# Patient Record
Sex: MALE | Race: WHITE | Employment: FULL TIME | URBAN - METROPOLITAN AREA
[De-identification: names, ages, dates, MRNs, and addresses within clinical notes are randomized per-mention and may not be internally consistent; named-entity substitution may affect disease eponyms.]

---

## 2018-04-26 RX ORDER — OMEPRAZOLE 20 MG/1
CAPSULE, DELAYED RELEASE ORAL
Qty: 30 CAPSULE | Refills: 0 | Status: SHIPPED | OUTPATIENT
Start: 2018-04-26 | End: 2018-06-01 | Stop reason: SDUPTHER

## 2018-06-01 RX ORDER — OMEPRAZOLE 20 MG/1
CAPSULE, DELAYED RELEASE ORAL
Qty: 30 CAPSULE | Refills: 2 | Status: SHIPPED | OUTPATIENT
Start: 2018-06-01 | End: 2018-08-08 | Stop reason: SDUPTHER

## 2018-06-29 ENCOUNTER — TELEPHONE (OUTPATIENT)
Dept: FAMILY MEDICINE CLINIC | Age: 53
End: 2018-06-29

## 2018-06-29 RX ORDER — IBUPROFEN 800 MG/1
800 TABLET ORAL EVERY 8 HOURS PRN
Qty: 60 TABLET | Refills: 1 | Status: SHIPPED | OUTPATIENT
Start: 2018-06-29 | End: 2018-08-31

## 2018-08-06 ENCOUNTER — TELEPHONE (OUTPATIENT)
Dept: FAMILY MEDICINE CLINIC | Age: 53
End: 2018-08-06

## 2018-08-09 RX ORDER — OMEPRAZOLE 20 MG/1
CAPSULE, DELAYED RELEASE ORAL
Qty: 90 CAPSULE | Refills: 3 | Status: SHIPPED | OUTPATIENT
Start: 2018-08-09 | End: 2018-08-31 | Stop reason: SDUPTHER

## 2018-08-09 RX ORDER — OMEPRAZOLE 20 MG/1
CAPSULE, DELAYED RELEASE ORAL
Qty: 30 CAPSULE | Refills: 5 | Status: SHIPPED | OUTPATIENT
Start: 2018-08-09 | End: 2018-08-09 | Stop reason: SDUPTHER

## 2018-08-09 RX ORDER — LEVOTHYROXINE SODIUM 0.2 MG/1
200 TABLET ORAL DAILY
Qty: 90 TABLET | Refills: 3 | Status: SHIPPED | OUTPATIENT
Start: 2018-08-09 | End: 2018-08-31 | Stop reason: SDUPTHER

## 2018-08-09 RX ORDER — LEVOTHYROXINE SODIUM 0.2 MG/1
200 TABLET ORAL DAILY
Qty: 30 TABLET | Refills: 5 | Status: SHIPPED | OUTPATIENT
Start: 2018-08-09 | End: 2018-08-09 | Stop reason: SDUPTHER

## 2018-08-31 ENCOUNTER — HOSPITAL ENCOUNTER (OUTPATIENT)
Age: 53
Discharge: HOME | End: 2018-09-02
Payer: COMMERCIAL

## 2018-08-31 ENCOUNTER — OFFICE VISIT (OUTPATIENT)
Dept: FAMILY MEDICINE CLINIC | Age: 53
End: 2018-08-31
Payer: COMMERCIAL

## 2018-08-31 VITALS
WEIGHT: 240 LBS | HEIGHT: 71 IN | TEMPERATURE: 98.2 F | DIASTOLIC BLOOD PRESSURE: 84 MMHG | OXYGEN SATURATION: 96 % | RESPIRATION RATE: 16 BRPM | HEART RATE: 81 BPM | BODY MASS INDEX: 33.6 KG/M2 | SYSTOLIC BLOOD PRESSURE: 126 MMHG

## 2018-08-31 DIAGNOSIS — Z01.818 PRE-OP EXAM: Primary | ICD-10-CM

## 2018-08-31 DIAGNOSIS — Z01.818 PRE-OP EXAM: ICD-10-CM

## 2018-08-31 DIAGNOSIS — Z23 NEED FOR TETANUS BOOSTER: ICD-10-CM

## 2018-08-31 LAB
ALBUMIN SERPL-MCNC: 4.3 G/DL (ref 3.5–5.2)
ALP BLD-CCNC: 60 U/L (ref 40–129)
ALT SERPL-CCNC: 83 U/L (ref 0–40)
ANION GAP SERPL CALCULATED.3IONS-SCNC: 16 MMOL/L (ref 7–16)
AST SERPL-CCNC: 94 U/L (ref 0–39)
BILIRUB SERPL-MCNC: 0.9 MG/DL (ref 0–1.2)
BUN BLDV-MCNC: 19 MG/DL (ref 6–20)
CALCIUM SERPL-MCNC: 9.7 MG/DL (ref 8.6–10.2)
CHLORIDE BLD-SCNC: 95 MMOL/L (ref 98–107)
CO2: 23 MMOL/L (ref 22–29)
CREAT SERPL-MCNC: 0.9 MG/DL (ref 0.7–1.2)
GFR AFRICAN AMERICAN: >60
GFR NON-AFRICAN AMERICAN: >60 ML/MIN/1.73
GLUCOSE BLD-MCNC: 89 MG/DL (ref 74–109)
POTASSIUM SERPL-SCNC: 4.6 MMOL/L (ref 3.5–5)
SODIUM BLD-SCNC: 134 MMOL/L (ref 132–146)
TOTAL PROTEIN: 8 G/DL (ref 6.4–8.3)

## 2018-08-31 PROCEDURE — 99214 OFFICE O/P EST MOD 30 MIN: CPT | Performed by: FAMILY MEDICINE

## 2018-08-31 PROCEDURE — 80053 COMPREHEN METABOLIC PANEL: CPT

## 2018-08-31 PROCEDURE — 90471 IMMUNIZATION ADMIN: CPT | Performed by: FAMILY MEDICINE

## 2018-08-31 PROCEDURE — 85025 COMPLETE CBC W/AUTO DIFF WBC: CPT

## 2018-08-31 PROCEDURE — 90715 TDAP VACCINE 7 YRS/> IM: CPT | Performed by: FAMILY MEDICINE

## 2018-08-31 RX ORDER — LEVOTHYROXINE SODIUM 0.2 MG/1
200 TABLET ORAL DAILY
Qty: 90 TABLET | Refills: 3 | Status: SHIPPED | OUTPATIENT
Start: 2018-08-31 | End: 2019-08-20 | Stop reason: SDUPTHER

## 2018-08-31 RX ORDER — LISINOPRIL AND HYDROCHLOROTHIAZIDE 25; 20 MG/1; MG/1
TABLET ORAL
Qty: 90 TABLET | Refills: 3 | Status: SHIPPED | OUTPATIENT
Start: 2018-08-31 | End: 2019-02-14 | Stop reason: SDUPTHER

## 2018-08-31 RX ORDER — OXYCODONE HYDROCHLORIDE AND ACETAMINOPHEN 5; 325 MG/1; MG/1
TABLET ORAL
COMMUNITY
Start: 2018-08-26 | End: 2019-02-14

## 2018-08-31 RX ORDER — OMEPRAZOLE 20 MG/1
CAPSULE, DELAYED RELEASE ORAL
Qty: 90 CAPSULE | Refills: 3 | Status: SHIPPED | OUTPATIENT
Start: 2018-08-31 | End: 2019-02-14 | Stop reason: SDUPTHER

## 2018-08-31 NOTE — PROGRESS NOTES
EKG 12 lead; Future  -     CBC Auto Differential; Future  -     Comprehensive Metabolic Panel; Future    Need for tetanus booster  -     Tdap (age 6y and older) IM ([address])    Other orders  -     omeprazole (PRILOSEC) 20 MG delayed release capsule; take 1 capsule by mouth once daily  -     lisinopril-hydrochlorothiazide (PRINZIDE;ZESTORETIC) 20-25 MG per tablet; take 1 tablet by mouth once daily  -     levothyroxine (SYNTHROID) 200 MCG tablet; Take 1 tablet by mouth daily          Educational materials and/or home exercises printed for patient's review and were included in patient instructions on his/her After Visit Summary and given to patient at the end of visit. Counseled regarding above diagnosis, including possible risks and complications,  especially if left uncontrolled. Counseled regarding the possible side effects, risks, benefits and alternatives to treatment; patient and/or guardian verbalizes understanding, agrees, feels comfortable with and wishes to proceed with above treatment plan. Advised patient to call with any new medication issues, and read all Rx info from pharmacy to assure aware of all possible risks and side effects of medication before taking. Reviewed age and gender appropriate health screening exams and vaccinations. Advised patient regarding importance of keeping up with recommended health maintenance and to schedule as soon as possible if overdue, as this is important in assessing for undiagnosed pathology, especially cancer, as well as protecting against potentially harmful/life threatening disease. Patient and/or guardian verbalizes understanding and agrees with above counseling, assessment and plan. All questions answered.

## 2018-09-01 LAB
BASOPHILS ABSOLUTE: 0.04 E9/L (ref 0–0.2)
BASOPHILS RELATIVE PERCENT: 0.6 % (ref 0–2)
EOSINOPHILS ABSOLUTE: 0.11 E9/L (ref 0.05–0.5)
EOSINOPHILS RELATIVE PERCENT: 1.8 % (ref 0–6)
HCT VFR BLD CALC: 48.7 % (ref 37–54)
HEMOGLOBIN: 15.9 G/DL (ref 12.5–16.5)
IMMATURE GRANULOCYTES #: 0.01 E9/L
IMMATURE GRANULOCYTES %: 0.2 % (ref 0–5)
LYMPHOCYTES ABSOLUTE: 2.63 E9/L (ref 1.5–4)
LYMPHOCYTES RELATIVE PERCENT: 42.4 % (ref 20–42)
MCH RBC QN AUTO: 31.2 PG (ref 26–35)
MCHC RBC AUTO-ENTMCNC: 32.6 % (ref 32–34.5)
MCV RBC AUTO: 95.7 FL (ref 80–99.9)
MONOCYTES ABSOLUTE: 0.74 E9/L (ref 0.1–0.95)
MONOCYTES RELATIVE PERCENT: 11.9 % (ref 2–12)
NEUTROPHILS ABSOLUTE: 2.67 E9/L (ref 1.8–7.3)
NEUTROPHILS RELATIVE PERCENT: 43.1 % (ref 43–80)
PDW BLD-RTO: 13.5 FL (ref 11.5–15)
PLATELET # BLD: 286 E9/L (ref 130–450)
PMV BLD AUTO: 10.4 FL (ref 7–12)
RBC # BLD: 5.09 E12/L (ref 3.8–5.8)
WBC # BLD: 6.2 E9/L (ref 4.5–11.5)

## 2018-09-04 DIAGNOSIS — R79.89 ELEVATED LFTS: Primary | ICD-10-CM

## 2018-09-10 ENCOUNTER — HOSPITAL ENCOUNTER (OUTPATIENT)
Age: 53
Discharge: HOME | End: 2018-09-12

## 2018-09-10 LAB
ABO/RH: NORMAL
ANTIBODY SCREEN: NORMAL

## 2018-09-10 PROCEDURE — 86850 RBC ANTIBODY SCREEN: CPT

## 2018-09-10 PROCEDURE — 86901 BLOOD TYPING SEROLOGIC RH(D): CPT

## 2018-09-10 PROCEDURE — 87088 URINE BACTERIA CULTURE: CPT

## 2018-09-10 PROCEDURE — 86900 BLOOD TYPING SEROLOGIC ABO: CPT

## 2018-09-10 PROCEDURE — 87081 CULTURE SCREEN ONLY: CPT

## 2018-09-12 LAB — MRSA CULTURE ONLY: NORMAL

## 2018-09-13 LAB — URINE CULTURE, ROUTINE: NORMAL

## 2018-09-17 ENCOUNTER — TELEPHONE (OUTPATIENT)
Dept: FAMILY MEDICINE CLINIC | Age: 53
End: 2018-09-17

## 2018-09-17 NOTE — TELEPHONE ENCOUNTER
He was seen on 8/31 for surgery clearance but dr Juvencio Abreu never received the clearance. his surgery is tomorrow and they need it faxed to them today at [phone]

## 2018-09-17 NOTE — LETTER
Dear Dr Shirley Saha,    Thank you for referring Nancy Otto to me for pre-operative clearance. After seeing the patient in the office, I have determined he is medically stable to proceed with surgery. He is low cardiac risk. Please feel free to contact the office with any questions.     Sincerely,             [facility]  [address] [place] [address] PRIMARY CARE  [address]  [address] [facility]  [address] [facility] [address]  Dept: [phone]

## 2018-09-18 ENCOUNTER — HOSPITAL ENCOUNTER (OUTPATIENT)
Age: 53
Discharge: HOME | End: 2018-09-20
Payer: COMMERCIAL

## 2018-09-18 PROCEDURE — 88304 TISSUE EXAM BY PATHOLOGIST: CPT

## 2018-09-18 PROCEDURE — 88311 DECALCIFY TISSUE: CPT

## 2018-09-19 ENCOUNTER — HOSPITAL ENCOUNTER (OUTPATIENT)
Age: 53
Discharge: HOME | End: 2018-09-21

## 2018-09-19 LAB
ANION GAP SERPL CALCULATED.3IONS-SCNC: 12 MMOL/L (ref 7–16)
BUN BLDV-MCNC: 23 MG/DL (ref 6–20)
CALCIUM SERPL-MCNC: 8.8 MG/DL (ref 8.6–10.2)
CHLORIDE BLD-SCNC: 99 MMOL/L (ref 98–107)
CO2: 24 MMOL/L (ref 22–29)
CREAT SERPL-MCNC: 0.9 MG/DL (ref 0.7–1.2)
GFR AFRICAN AMERICAN: >60
GFR NON-AFRICAN AMERICAN: >60 ML/MIN/1.73
GLUCOSE BLD-MCNC: 178 MG/DL (ref 74–109)
HCT VFR BLD CALC: 37.8 % (ref 37–54)
HEMOGLOBIN: 12.8 G/DL (ref 12.5–16.5)
MCH RBC QN AUTO: 32.8 PG (ref 26–35)
MCHC RBC AUTO-ENTMCNC: 33.9 % (ref 32–34.5)
MCV RBC AUTO: 96.9 FL (ref 80–99.9)
PDW BLD-RTO: 13.2 FL (ref 11.5–15)
PLATELET # BLD: 218 E9/L (ref 130–450)
PMV BLD AUTO: 11.3 FL (ref 7–12)
POTASSIUM SERPL-SCNC: 4.5 MMOL/L (ref 3.5–5)
RBC # BLD: 3.9 E12/L (ref 3.8–5.8)
SODIUM BLD-SCNC: 135 MMOL/L (ref 132–146)
WBC # BLD: 11.9 E9/L (ref 4.5–11.5)

## 2018-09-19 PROCEDURE — 80048 BASIC METABOLIC PNL TOTAL CA: CPT

## 2018-09-19 PROCEDURE — 85027 COMPLETE CBC AUTOMATED: CPT

## 2018-09-20 ENCOUNTER — HOSPITAL ENCOUNTER (OUTPATIENT)
Age: 53
Discharge: HOME | End: 2018-09-22

## 2018-09-20 LAB
ANION GAP SERPL CALCULATED.3IONS-SCNC: 10 MMOL/L (ref 7–16)
BUN BLDV-MCNC: 18 MG/DL (ref 6–20)
CALCIUM SERPL-MCNC: 8.7 MG/DL (ref 8.6–10.2)
CHLORIDE BLD-SCNC: 99 MMOL/L (ref 98–107)
CO2: 28 MMOL/L (ref 22–29)
CREAT SERPL-MCNC: 0.9 MG/DL (ref 0.7–1.2)
GFR AFRICAN AMERICAN: >60
GFR NON-AFRICAN AMERICAN: >60 ML/MIN/1.73
GLUCOSE BLD-MCNC: 114 MG/DL (ref 74–109)
HCT VFR BLD CALC: 36.3 % (ref 37–54)
HEMOGLOBIN: 12.3 G/DL (ref 12.5–16.5)
MCH RBC QN AUTO: 32.9 PG (ref 26–35)
MCHC RBC AUTO-ENTMCNC: 33.9 % (ref 32–34.5)
MCV RBC AUTO: 97.1 FL (ref 80–99.9)
PDW BLD-RTO: 13.6 FL (ref 11.5–15)
PLATELET # BLD: 205 E9/L (ref 130–450)
PMV BLD AUTO: 10.9 FL (ref 7–12)
POTASSIUM SERPL-SCNC: 4.2 MMOL/L (ref 3.5–5)
RBC # BLD: 3.74 E12/L (ref 3.8–5.8)
SODIUM BLD-SCNC: 137 MMOL/L (ref 132–146)
WBC # BLD: 9.6 E9/L (ref 4.5–11.5)

## 2018-09-20 PROCEDURE — 80048 BASIC METABOLIC PNL TOTAL CA: CPT

## 2018-09-20 PROCEDURE — 85027 COMPLETE CBC AUTOMATED: CPT

## 2018-10-16 ENCOUNTER — TELEPHONE (OUTPATIENT)
Dept: FAMILY MEDICINE CLINIC | Age: 53
End: 2018-10-16

## 2018-10-25 RX ORDER — AMOXICILLIN AND CLAVULANATE POTASSIUM 875; 125 MG/1; MG/1
1 TABLET, FILM COATED ORAL 2 TIMES DAILY
Qty: 20 TABLET | Refills: 0 | Status: SHIPPED | OUTPATIENT
Start: 2018-10-25 | End: 2018-11-04

## 2019-02-14 ENCOUNTER — HOSPITAL ENCOUNTER (OUTPATIENT)
Dept: GENERAL RADIOLOGY | Age: 54
Discharge: HOME | End: 2019-02-16
Payer: COMMERCIAL

## 2019-02-14 ENCOUNTER — OFFICE VISIT (OUTPATIENT)
Dept: FAMILY MEDICINE CLINIC | Age: 54
End: 2019-02-14
Payer: COMMERCIAL

## 2019-02-14 ENCOUNTER — HOSPITAL ENCOUNTER (OUTPATIENT)
Age: 54
Discharge: HOME | End: 2019-02-16
Payer: COMMERCIAL

## 2019-02-14 VITALS
SYSTOLIC BLOOD PRESSURE: 138 MMHG | TEMPERATURE: 97.8 F | HEIGHT: 70 IN | RESPIRATION RATE: 16 BRPM | OXYGEN SATURATION: 93 % | BODY MASS INDEX: 36.65 KG/M2 | DIASTOLIC BLOOD PRESSURE: 90 MMHG | WEIGHT: 256 LBS | HEART RATE: 78 BPM

## 2019-02-14 DIAGNOSIS — E03.9 ACQUIRED HYPOTHYROIDISM: ICD-10-CM

## 2019-02-14 DIAGNOSIS — R06.02 SHORTNESS OF BREATH: ICD-10-CM

## 2019-02-14 DIAGNOSIS — R05.9 COUGH: ICD-10-CM

## 2019-02-14 DIAGNOSIS — Z23 NEED FOR INFLUENZA VACCINATION: ICD-10-CM

## 2019-02-14 DIAGNOSIS — R53.83 FATIGUE, UNSPECIFIED TYPE: ICD-10-CM

## 2019-02-14 DIAGNOSIS — I10 ESSENTIAL HYPERTENSION: ICD-10-CM

## 2019-02-14 DIAGNOSIS — F32.9 REACTIVE DEPRESSION: Primary | ICD-10-CM

## 2019-02-14 LAB
ALBUMIN SERPL-MCNC: 4.1 G/DL (ref 3.5–5.2)
ALP BLD-CCNC: 83 U/L (ref 40–129)
ALT SERPL-CCNC: 149 U/L (ref 0–40)
ANION GAP SERPL CALCULATED.3IONS-SCNC: 14 MMOL/L (ref 7–16)
AST SERPL-CCNC: 130 U/L (ref 0–39)
BASOPHILS ABSOLUTE: 0.03 E9/L (ref 0–0.2)
BASOPHILS RELATIVE PERCENT: 0.6 % (ref 0–2)
BILIRUB SERPL-MCNC: 0.9 MG/DL (ref 0–1.2)
BUN BLDV-MCNC: 20 MG/DL (ref 6–20)
CALCIUM SERPL-MCNC: 9.5 MG/DL (ref 8.6–10.2)
CHLORIDE BLD-SCNC: 97 MMOL/L (ref 98–107)
CHOLESTEROL, TOTAL: 193 MG/DL (ref 0–199)
CO2: 24 MMOL/L (ref 22–29)
CREAT SERPL-MCNC: 1 MG/DL (ref 0.7–1.2)
EOSINOPHILS ABSOLUTE: 0.13 E9/L (ref 0.05–0.5)
EOSINOPHILS RELATIVE PERCENT: 2.4 % (ref 0–6)
GFR AFRICAN AMERICAN: >60
GFR NON-AFRICAN AMERICAN: >60 ML/MIN/1.73
GLUCOSE BLD-MCNC: 96 MG/DL (ref 74–99)
HCT VFR BLD CALC: 48.7 % (ref 37–54)
HDLC SERPL-MCNC: 41 MG/DL
HEMOGLOBIN: 16.2 G/DL (ref 12.5–16.5)
IMMATURE GRANULOCYTES #: 0.01 E9/L
IMMATURE GRANULOCYTES %: 0.2 % (ref 0–5)
LDL CHOLESTEROL CALCULATED: 120 MG/DL (ref 0–99)
LYMPHOCYTES ABSOLUTE: 2 E9/L (ref 1.5–4)
LYMPHOCYTES RELATIVE PERCENT: 37.1 % (ref 20–42)
MCH RBC QN AUTO: 31.8 PG (ref 26–35)
MCHC RBC AUTO-ENTMCNC: 33.3 % (ref 32–34.5)
MCV RBC AUTO: 95.7 FL (ref 80–99.9)
MONOCYTES ABSOLUTE: 0.76 E9/L (ref 0.1–0.95)
MONOCYTES RELATIVE PERCENT: 14.1 % (ref 2–12)
NEUTROPHILS ABSOLUTE: 2.46 E9/L (ref 1.8–7.3)
NEUTROPHILS RELATIVE PERCENT: 45.6 % (ref 43–80)
PDW BLD-RTO: 13.3 FL (ref 11.5–15)
PLATELET # BLD: 265 E9/L (ref 130–450)
PMV BLD AUTO: 10.2 FL (ref 7–12)
POTASSIUM SERPL-SCNC: 4.5 MMOL/L (ref 3.5–5)
RBC # BLD: 5.09 E12/L (ref 3.8–5.8)
SODIUM BLD-SCNC: 135 MMOL/L (ref 132–146)
T4 FREE: 1.9 NG/DL (ref 0.93–1.7)
TOTAL PROTEIN: 8.1 G/DL (ref 6.4–8.3)
TRIGL SERPL-MCNC: 160 MG/DL (ref 0–149)
TSH SERPL DL<=0.05 MIU/L-ACNC: 2.92 UIU/ML (ref 0.27–4.2)
VITAMIN D 25-HYDROXY: 35 NG/ML (ref 30–100)
VLDLC SERPL CALC-MCNC: 32 MG/DL
WBC # BLD: 5.4 E9/L (ref 4.5–11.5)

## 2019-02-14 PROCEDURE — 84439 ASSAY OF FREE THYROXINE: CPT

## 2019-02-14 PROCEDURE — 90686 IIV4 VACC NO PRSV 0.5 ML IM: CPT | Performed by: FAMILY MEDICINE

## 2019-02-14 PROCEDURE — 84443 ASSAY THYROID STIM HORMONE: CPT

## 2019-02-14 PROCEDURE — 99214 OFFICE O/P EST MOD 30 MIN: CPT | Performed by: FAMILY MEDICINE

## 2019-02-14 PROCEDURE — 71046 X-RAY EXAM CHEST 2 VIEWS: CPT

## 2019-02-14 PROCEDURE — 82746 ASSAY OF FOLIC ACID SERUM: CPT

## 2019-02-14 PROCEDURE — 82607 VITAMIN B-12: CPT

## 2019-02-14 PROCEDURE — 90471 IMMUNIZATION ADMIN: CPT | Performed by: FAMILY MEDICINE

## 2019-02-14 PROCEDURE — 80053 COMPREHEN METABOLIC PANEL: CPT

## 2019-02-14 PROCEDURE — 82306 VITAMIN D 25 HYDROXY: CPT

## 2019-02-14 PROCEDURE — 80061 LIPID PANEL: CPT

## 2019-02-14 PROCEDURE — 85025 COMPLETE CBC W/AUTO DIFF WBC: CPT

## 2019-02-14 RX ORDER — IBUPROFEN 800 MG/1
800 TABLET ORAL EVERY 8 HOURS PRN
Qty: 90 TABLET | Refills: 11 | Status: SHIPPED | OUTPATIENT
Start: 2019-02-14 | End: 2019-04-12 | Stop reason: SDUPTHER

## 2019-02-14 RX ORDER — OMEPRAZOLE 20 MG/1
CAPSULE, DELAYED RELEASE ORAL
Qty: 30 CAPSULE | Refills: 11 | Status: SHIPPED | OUTPATIENT
Start: 2019-02-14 | End: 2019-08-20 | Stop reason: SDUPTHER

## 2019-02-14 RX ORDER — LISINOPRIL AND HYDROCHLOROTHIAZIDE 25; 20 MG/1; MG/1
TABLET ORAL
Qty: 30 TABLET | Refills: 11 | Status: SHIPPED | OUTPATIENT
Start: 2019-02-14 | End: 2019-08-20 | Stop reason: SDUPTHER

## 2019-02-14 RX ORDER — IBUPROFEN 800 MG/1
800 TABLET ORAL EVERY 8 HOURS PRN
COMMUNITY
End: 2019-02-14 | Stop reason: SDUPTHER

## 2019-02-14 RX ORDER — BUPROPION HYDROCHLORIDE 150 MG/1
150 TABLET ORAL EVERY MORNING
Qty: 30 TABLET | Refills: 1 | Status: SHIPPED | OUTPATIENT
Start: 2019-02-14 | End: 2019-08-20 | Stop reason: SDUPTHER

## 2019-02-14 ASSESSMENT — PATIENT HEALTH QUESTIONNAIRE - PHQ9
1. LITTLE INTEREST OR PLEASURE IN DOING THINGS: 2
9. THOUGHTS THAT YOU WOULD BE BETTER OFF DEAD, OR OF HURTING YOURSELF: 0
3. TROUBLE FALLING OR STAYING ASLEEP: 3
SUM OF ALL RESPONSES TO PHQ QUESTIONS 1-9: 16
SUM OF ALL RESPONSES TO PHQ9 QUESTIONS 1 & 2: 4
5. POOR APPETITE OR OVEREATING: 3
4. FEELING TIRED OR HAVING LITTLE ENERGY: 3
8. MOVING OR SPEAKING SO SLOWLY THAT OTHER PEOPLE COULD HAVE NOTICED. OR THE OPPOSITE, BEING SO FIGETY OR RESTLESS THAT YOU HAVE BEEN MOVING AROUND A LOT MORE THAN USUAL: 0
10. IF YOU CHECKED OFF ANY PROBLEMS, HOW DIFFICULT HAVE THESE PROBLEMS MADE IT FOR YOU TO DO YOUR WORK, TAKE CARE OF THINGS AT HOME, OR GET ALONG WITH OTHER PEOPLE: 3
SUM OF ALL RESPONSES TO PHQ QUESTIONS 1-9: 16
2. FEELING DOWN, DEPRESSED OR HOPELESS: 2
7. TROUBLE CONCENTRATING ON THINGS, SUCH AS READING THE NEWSPAPER OR WATCHING TELEVISION: 3

## 2019-02-15 LAB
FOLATE: 7.1 NG/ML (ref 4.8–24.2)
VITAMIN B-12: 820 PG/ML (ref 211–946)

## 2019-02-18 DIAGNOSIS — R79.89 ELEVATED LFTS: Primary | ICD-10-CM

## 2019-02-25 ENCOUNTER — OFFICE VISIT (OUTPATIENT)
Dept: FAMILY MEDICINE CLINIC | Age: 54
End: 2019-02-25
Payer: COMMERCIAL

## 2019-02-25 VITALS
DIASTOLIC BLOOD PRESSURE: 83 MMHG | RESPIRATION RATE: 18 BRPM | SYSTOLIC BLOOD PRESSURE: 121 MMHG | TEMPERATURE: 98.3 F | WEIGHT: 260 LBS | HEART RATE: 79 BPM | HEIGHT: 70 IN | BODY MASS INDEX: 37.22 KG/M2 | OXYGEN SATURATION: 97 %

## 2019-02-25 DIAGNOSIS — R05.9 COUGH: Primary | ICD-10-CM

## 2019-02-25 LAB
INFLUENZA A ANTIGEN, POC: NORMAL
INFLUENZA B ANTIGEN, POC: NORMAL

## 2019-02-25 PROCEDURE — 87804 INFLUENZA ASSAY W/OPTIC: CPT | Performed by: FAMILY MEDICINE

## 2019-02-25 PROCEDURE — 99213 OFFICE O/P EST LOW 20 MIN: CPT | Performed by: FAMILY MEDICINE

## 2019-02-25 RX ORDER — DOXYCYCLINE HYCLATE 100 MG
100 TABLET ORAL 2 TIMES DAILY WITH MEALS
Qty: 20 TABLET | Refills: 0 | Status: SHIPPED | OUTPATIENT
Start: 2019-02-25 | End: 2019-03-07

## 2019-03-11 ENCOUNTER — HOSPITAL ENCOUNTER (OUTPATIENT)
Dept: ULTRASOUND IMAGING | Age: 54
Discharge: HOME | End: 2019-03-11
Payer: COMMERCIAL

## 2019-03-11 DIAGNOSIS — R79.89 ELEVATED LFTS: ICD-10-CM

## 2019-03-11 PROCEDURE — 76705 ECHO EXAM OF ABDOMEN: CPT

## 2019-04-12 ENCOUNTER — OFFICE VISIT (OUTPATIENT)
Dept: FAMILY MEDICINE CLINIC | Age: 54
End: 2019-04-12
Payer: COMMERCIAL

## 2019-04-12 VITALS
HEART RATE: 86 BPM | RESPIRATION RATE: 16 BRPM | HEIGHT: 71 IN | SYSTOLIC BLOOD PRESSURE: 124 MMHG | TEMPERATURE: 98 F | BODY MASS INDEX: 36.68 KG/M2 | WEIGHT: 262 LBS | DIASTOLIC BLOOD PRESSURE: 86 MMHG | OXYGEN SATURATION: 97 %

## 2019-04-12 DIAGNOSIS — J01.90 ACUTE BACTERIAL SINUSITIS: Primary | ICD-10-CM

## 2019-04-12 DIAGNOSIS — Z12.11 SCREEN FOR COLON CANCER: ICD-10-CM

## 2019-04-12 DIAGNOSIS — B96.89 ACUTE BACTERIAL SINUSITIS: Primary | ICD-10-CM

## 2019-04-12 PROCEDURE — 99213 OFFICE O/P EST LOW 20 MIN: CPT | Performed by: FAMILY MEDICINE

## 2019-04-12 RX ORDER — IBUPROFEN 800 MG/1
800 TABLET ORAL EVERY 8 HOURS PRN
Qty: 90 TABLET | Refills: 11 | Status: SHIPPED | OUTPATIENT
Start: 2019-04-12 | End: 2019-08-20 | Stop reason: SDUPTHER

## 2019-04-12 RX ORDER — AMOXICILLIN AND CLAVULANATE POTASSIUM 875; 125 MG/1; MG/1
1 TABLET, FILM COATED ORAL 2 TIMES DAILY WITH MEALS
Qty: 20 TABLET | Refills: 0 | Status: SHIPPED | OUTPATIENT
Start: 2019-04-12 | End: 2019-04-22

## 2019-04-12 NOTE — PROGRESS NOTES
Congestion:  Patient is here with complaints of congestion, sinus pressure, drainage and cough for 3 day(s). Cough is  productive. Over the counter medications include none. Patient does not have a change in appetite. Patient is  drinking well. Patient does smoke. Patient's past medical, surgical, social and/or family history reviewed, updated in chart, and are non-contributory (unless otherwise stated). Medications and allergies also reviewed and updated in chart. Review of Systems:  Constitutional:  - fever, + fatigue, + chills, + headaches, no weight change, +reduced appetite  Dermatology:  No rash, no mole, no dry or sensitive skin  ENT:  + cough, + sore throat, + sinus pain, + runny nose, no ear pain  Cardiology:  No chest pain, no palpitations, no leg edema, no shortness of breath, no PND  Gastroenterology:  No dysphagia, no abdominal pain, no nausea, no vomiting, no constipation, no diarrhea, no heartburn  Musculoskeletal:  No joint pain, no leg cramps, no back pain, no muscle aches  Respiratory:  No shortness of breath, no orthopnea, no wheezing, no MCBRIDE, no hemoptysis  Urology:  No blood in the urine, no urinary frequency, no urinary incontinence, no urinary urgency, no nocturia, no dysuria          Past Medical History, Surgical History, and Family History has been reviewed and updated.     Physical Exam:  Vitals:    04/12/19 1205   BP: 124/86   Pulse: 86   Resp: 16   Temp: 98 °F (36.7 °C)   TempSrc: Oral   SpO2: 97%   Weight: 262 lb (118.8 kg)   Height: 5' 10.5\" (1.791 m)     General:  Patient alert and oriented x 3, NAD, pleasant  HEENT:  Atraumatic, normocephalic, PERRLA, EOMI, clear conjunctiva, TMs clear, nose-congested, + sinus tenderness, throat - + erythema  Neck:  Supple, no goiter, no carotid bruits, no LAD  Lungs:  CTA B  Heart:  RRR, no murmurs, gallops or rubs  Abdomen:  Soft, NTND, + bowel sounds  Extremities:  No clubbing, cyanosis or edema  Skin: no serge    Assessment/Plan:  Diagnoses and all orders for this visit:    Acute bacterial sinusitis    Screen for colon cancer  -     Rusty Melendez MD, [place] ([facility])    Other orders  -     ibuprofen (ADVIL;MOTRIN) 800 MG tablet; Take 1 tablet by mouth every 8 hours as needed for Pain  -     amoxicillin-clavulanate (AUGMENTIN) 875-125 MG per tablet; Take 1 tablet by mouth 2 times daily (with meals) for 10 days        Increase fluids, rest, Tylenol every 4-6 hours as needed for fever or body aches. Return to office if symptoms worsen. As above. Call or go to ED immediately if symptoms worsen or persist.  Return in about 4 months (around 8/12/2019). , or sooner if necessary. Educational materials and/or home exercises printed for patient's review and were included in patient instructions on his/her After Visit Summary and given to patient at the end of visit. Counseled regarding above diagnosis, including possible risks and complications,  especially if left uncontrolled. Counseled regarding the possible side effects, risks, benefits and alternatives to treatment; patient and/or guardian verbalizes understanding, agrees, feels comfortable with and wishes to proceed with above treatment plan. Advised patient to call with any new medication issues, and read all Rx info from pharmacy to assure aware of all possible risks and side effects of medication before taking. Patient and/or guardian verbalizes understanding and agrees with above counseling, assessment and plan. All questions answered.

## 2019-04-12 NOTE — PATIENT INSTRUCTIONS
polyunsaturated fat. Eating fish may lower your chances of getting coronary artery disease. Fatty fish such as salmon and mackerel contain these healthy fatty acids. So do ground flaxseeds and flaxseed oil, soybeans, walnuts, and seeds. Why cut down on unhealthy fats? Eating foods that contain saturated fats can raise the LDL (\"bad\") cholesterol in your blood. Having a high level of LDL cholesterol increases your chance of hardening of the arteries (atherosclerosis), which can lead to heart disease, heart attack, and stroke. Trans fat raises the level of \"bad\" LDL cholesterol in your blood and may lower the \"good\" HDL cholesterol in your blood. Trans fat can raise your risk of heart disease, heart attack, and stroke. In general:  · No more than 10% of your daily calories should come from saturated fat. This is about 20 grams in a 2,000-calorie diet. · No more than 10% of your daily calories should come from polyunsaturated fat. This is about 20 grams in a 2,000-calorie diet. · Monounsaturated fats can be up to 15% of your daily calories. This is about 25 to 30 grams in a 2,000-calorie diet. If you're not sure how much fat you should be eating or how many calories you need each day to stay at a healthy weight, talk to a registered dietitian. He or she can help you create a plan that's right for you. What can you do to cut down on fat? Foods like cheese, butter, sausage, and desserts can have a lot of unhealthy fats. Try these tips for healthier meals at home and when you eat out. At home  · Fill up on fruits, vegetables, and whole grains. · Think of meat as a side dish instead of as the main part of your meal.  · When you do eat meat, make it extra-lean ground beef (97% lean), ground turkey breast (without skin added), meats with fat trimmed off before cooking, or skinless chicken. · Try main dishes that use whole wheat pasta, brown rice, dried beans, or vegetables.   · Use cooking methods that use little or no fat, such as broiling, steaming, or grilling. Use cooking spray instead of oil. If you use oil, use a monounsaturated oil, such as canola or olive oil. · Read food labels on canned, bottled, or packaged foods. Choose those with little saturated fat and no trans fat. When eating out at a restaurant  · Order foods that are broiled or poached instead of fried or breaded. · Cut back on the amount of butter or margarine that you use on bread. Use small amounts of olive oil instead. · Order sauces, gravies, and salad dressings on the side, and use only a little. · When you order pasta, choose tomato sauce instead of cream sauce. · Ask for salsa with your baked potato instead of sour cream, butter, cheese, or brown. Where can you learn more? Go to https://[place] [employer] org and sign in to your [employer]+ account. Enter I451 in the Spotlight.fm box to learn more about \"Learning About Low-Fat Eating. \"     If you do not have an account, please click on the \"Sign Up Now\" link. Current as of: March 28, 2018  Content Version: 11.9  © 8387-6473 [employer]. Care instructions adapted under license by [facility] If you have questions about a medical condition or this instruction, always ask your healthcare professional. [place] [address] any warranty or liability for your use of this information.

## 2019-07-16 DIAGNOSIS — I10 ESSENTIAL HYPERTENSION: ICD-10-CM

## 2019-07-16 DIAGNOSIS — E03.9 ACQUIRED HYPOTHYROIDISM: Primary | ICD-10-CM

## 2019-08-17 LAB
ALBUMIN SERPL-MCNC: NORMAL G/DL
ALP BLD-CCNC: NORMAL U/L
ALT SERPL-CCNC: 104 U/L
ANION GAP SERPL CALCULATED.3IONS-SCNC: NORMAL MMOL/L
AST SERPL-CCNC: 84 U/L
BILIRUB SERPL-MCNC: NORMAL MG/DL (ref 0.1–1.4)
BUN BLDV-MCNC: 25 MG/DL
CALCIUM SERPL-MCNC: NORMAL MG/DL
CHLORIDE BLD-SCNC: NORMAL MMOL/L
CHOLESTEROL, TOTAL: 165 MG/DL
CHOLESTEROL/HDL RATIO: NORMAL
CO2: NORMAL MMOL/L
CREAT SERPL-MCNC: 1.19 MG/DL
GFR CALCULATED: NORMAL
GLUCOSE BLD-MCNC: 90 MG/DL
HDLC SERPL-MCNC: 42 MG/DL (ref 35–70)
LDL CHOLESTEROL CALCULATED: 93 MG/DL (ref 0–160)
POTASSIUM SERPL-SCNC: NORMAL MMOL/L
SODIUM BLD-SCNC: NORMAL MMOL/L
TOTAL PROTEIN: NORMAL
TRIGL SERPL-MCNC: 149 MG/DL
TSH SERPL DL<=0.05 MIU/L-ACNC: 0.03 UIU/ML
VLDLC SERPL CALC-MCNC: NORMAL MG/DL

## 2019-08-20 ENCOUNTER — OFFICE VISIT (OUTPATIENT)
Dept: FAMILY MEDICINE CLINIC | Age: 54
End: 2019-08-20
Payer: COMMERCIAL

## 2019-08-20 VITALS
HEART RATE: 72 BPM | OXYGEN SATURATION: 94 % | TEMPERATURE: 98.2 F | BODY MASS INDEX: 31.22 KG/M2 | HEIGHT: 71 IN | DIASTOLIC BLOOD PRESSURE: 85 MMHG | SYSTOLIC BLOOD PRESSURE: 120 MMHG | WEIGHT: 223 LBS

## 2019-08-20 DIAGNOSIS — Z12.11 SPECIAL SCREENING FOR MALIGNANT NEOPLASMS, COLON: ICD-10-CM

## 2019-08-20 DIAGNOSIS — F32.9 REACTIVE DEPRESSION: ICD-10-CM

## 2019-08-20 DIAGNOSIS — I10 ESSENTIAL HYPERTENSION: ICD-10-CM

## 2019-08-20 DIAGNOSIS — E03.9 ACQUIRED HYPOTHYROIDISM: ICD-10-CM

## 2019-08-20 DIAGNOSIS — E03.9 ACQUIRED HYPOTHYROIDISM: Primary | ICD-10-CM

## 2019-08-20 DIAGNOSIS — R53.83 FATIGUE, UNSPECIFIED TYPE: ICD-10-CM

## 2019-08-20 DIAGNOSIS — R73.9 HYPERGLYCEMIA: ICD-10-CM

## 2019-08-20 PROCEDURE — 99214 OFFICE O/P EST MOD 30 MIN: CPT | Performed by: FAMILY MEDICINE

## 2019-08-20 RX ORDER — LEVOTHYROXINE SODIUM 175 UG/1
175 TABLET ORAL DAILY
Qty: 30 TABLET | Refills: 5 | Status: SHIPPED
Start: 2019-08-20 | End: 2020-02-12

## 2019-08-20 RX ORDER — IBUPROFEN 800 MG/1
800 TABLET ORAL EVERY 8 HOURS PRN
Qty: 90 TABLET | Refills: 11 | Status: SHIPPED
Start: 2019-08-20 | End: 2020-10-15 | Stop reason: SDUPTHER

## 2019-08-20 RX ORDER — LISINOPRIL AND HYDROCHLOROTHIAZIDE 25; 20 MG/1; MG/1
TABLET ORAL
Qty: 30 TABLET | Refills: 11 | Status: SHIPPED
Start: 2019-08-20 | End: 2020-08-21 | Stop reason: SDUPTHER

## 2019-08-20 RX ORDER — BUPROPION HYDROCHLORIDE 150 MG/1
150 TABLET ORAL EVERY MORNING
Qty: 30 TABLET | Refills: 11 | Status: SHIPPED | OUTPATIENT
Start: 2019-08-20 | End: 2019-08-20

## 2019-08-20 RX ORDER — OMEPRAZOLE 20 MG/1
CAPSULE, DELAYED RELEASE ORAL
Qty: 30 CAPSULE | Refills: 11 | Status: SHIPPED
Start: 2019-08-20 | End: 2020-08-21 | Stop reason: SDUPTHER

## 2019-09-27 LAB — ANTIBODY: NORMAL

## 2019-10-11 ENCOUNTER — TELEPHONE (OUTPATIENT)
Dept: FAMILY MEDICINE CLINIC | Age: 54
End: 2019-10-11

## 2019-10-11 RX ORDER — AZITHROMYCIN 250 MG/1
250 TABLET, FILM COATED ORAL SEE ADMIN INSTRUCTIONS
Qty: 6 TABLET | Refills: 0 | Status: SHIPPED | OUTPATIENT
Start: 2019-10-11 | End: 2019-10-16

## 2020-02-12 RX ORDER — LEVOTHYROXINE SODIUM 175 UG/1
TABLET ORAL
Qty: 30 TABLET | Refills: 5 | Status: SHIPPED
Start: 2020-02-12 | End: 2020-08-20 | Stop reason: SDUPTHER

## 2020-08-06 RX ORDER — LEVOTHYROXINE SODIUM 175 UG/1
TABLET ORAL
Qty: 30 TABLET | Refills: 5 | OUTPATIENT
Start: 2020-08-06

## 2020-08-21 RX ORDER — LISINOPRIL AND HYDROCHLOROTHIAZIDE 25; 20 MG/1; MG/1
TABLET ORAL
Qty: 30 TABLET | Refills: 11 | Status: SHIPPED
Start: 2020-08-21 | End: 2020-10-15 | Stop reason: SDUPTHER

## 2020-08-21 RX ORDER — OMEPRAZOLE 20 MG/1
CAPSULE, DELAYED RELEASE ORAL
Qty: 30 CAPSULE | Refills: 11 | Status: SHIPPED
Start: 2020-08-21 | End: 2020-10-15 | Stop reason: SDUPTHER

## 2020-08-21 RX ORDER — LEVOTHYROXINE SODIUM 175 UG/1
TABLET ORAL
Qty: 30 TABLET | Refills: 5 | Status: SHIPPED
Start: 2020-08-21 | End: 2020-10-15 | Stop reason: SDUPTHER

## 2020-10-15 ENCOUNTER — OFFICE VISIT (OUTPATIENT)
Dept: FAMILY MEDICINE CLINIC | Age: 55
End: 2020-10-15
Payer: COMMERCIAL

## 2020-10-15 VITALS
RESPIRATION RATE: 18 BRPM | WEIGHT: 261 LBS | HEART RATE: 78 BPM | BODY MASS INDEX: 38.66 KG/M2 | OXYGEN SATURATION: 96 % | HEIGHT: 69 IN | DIASTOLIC BLOOD PRESSURE: 90 MMHG | TEMPERATURE: 97.4 F | SYSTOLIC BLOOD PRESSURE: 138 MMHG

## 2020-10-15 PROBLEM — E78.5 HYPERLIPIDEMIA: Status: ACTIVE | Noted: 2020-10-15

## 2020-10-15 PROBLEM — Z87.39: Status: ACTIVE | Noted: 2020-10-15

## 2020-10-15 LAB — HBA1C MFR BLD: 5.6 %

## 2020-10-15 PROCEDURE — 99214 OFFICE O/P EST MOD 30 MIN: CPT | Performed by: FAMILY MEDICINE

## 2020-10-15 PROCEDURE — 83036 HEMOGLOBIN GLYCOSYLATED A1C: CPT | Performed by: FAMILY MEDICINE

## 2020-10-15 RX ORDER — LISINOPRIL AND HYDROCHLOROTHIAZIDE 25; 20 MG/1; MG/1
1 TABLET ORAL DAILY
Qty: 90 TABLET | Refills: 1 | Status: SHIPPED
Start: 2020-10-15 | End: 2021-02-05 | Stop reason: SDUPTHER

## 2020-10-15 RX ORDER — DULOXETIN HYDROCHLORIDE 20 MG/1
20 CAPSULE, DELAYED RELEASE ORAL DAILY
Qty: 90 CAPSULE | Refills: 1 | Status: SHIPPED
Start: 2020-10-15 | End: 2021-01-05

## 2020-10-15 RX ORDER — IBUPROFEN 800 MG/1
800 TABLET ORAL EVERY 8 HOURS PRN
Qty: 90 TABLET | Refills: 11 | Status: SHIPPED
Start: 2020-10-15 | End: 2020-10-15

## 2020-10-15 RX ORDER — HYDROCODONE BITARTRATE AND ACETAMINOPHEN 5; 325 MG/1; MG/1
1 TABLET ORAL EVERY 6 HOURS PRN
Qty: 20 TABLET | Refills: 0 | Status: SHIPPED | OUTPATIENT
Start: 2020-10-15 | End: 2020-10-20

## 2020-10-15 RX ORDER — LEVOTHYROXINE SODIUM 175 UG/1
TABLET ORAL
Qty: 90 TABLET | Refills: 1 | Status: SHIPPED
Start: 2020-10-15 | End: 2021-01-07 | Stop reason: SDUPTHER

## 2020-10-15 RX ORDER — OMEPRAZOLE 20 MG/1
20 CAPSULE, DELAYED RELEASE ORAL DAILY
Qty: 90 CAPSULE | Refills: 1 | Status: SHIPPED
Start: 2020-10-15 | End: 2021-02-05 | Stop reason: SDUPTHER

## 2020-10-15 ASSESSMENT — PATIENT HEALTH QUESTIONNAIRE - PHQ9
SUM OF ALL RESPONSES TO PHQ QUESTIONS 1-9: 2
1. LITTLE INTEREST OR PLEASURE IN DOING THINGS: 1
2. FEELING DOWN, DEPRESSED OR HOPELESS: 1
SUM OF ALL RESPONSES TO PHQ QUESTIONS 1-9: 2
SUM OF ALL RESPONSES TO PHQ QUESTIONS 1-9: 2
SUM OF ALL RESPONSES TO PHQ9 QUESTIONS 1 & 2: 2

## 2020-10-15 NOTE — PROGRESS NOTES
CC: Pennye Hodgkin is a 50 y.o. 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  Had both hips replac    RYLEY- not on CPAP  Cannot wear mask    Hypothyroidism  Adherent with 175mcg  He has been gaining weight recently    Depression  Family concerns causing stress  No SI/HI        Past Medical History:   Diagnosis Date    GERD (gastroesophageal reflux disease)     Hypertension     Hypothyroidism     Nephrolithiasis     Sleep apnea     Thyroid disease      Past Surgical History:   Procedure Laterality Date    LITHOTRIPSY       Family History   Problem Relation Age of Onset    Cancer Mother 77        colon    High Blood Pressure Mother     Cancer Father 77        liver    High Blood Pressure Father     Cancer Paternal Aunt     Cancer Paternal Uncle      Social History     Tobacco Use    Smoking status: Former Smoker     Packs/day: 0.25     Years: 30.00     Pack years: 7.50     Types: Cigarettes     Last attempt to quit: 2018     Years since quittin.6    Smokeless tobacco: Never Used   Substance Use Topics    Alcohol use: Yes     Alcohol/week: 0.0 standard drinks     Comment: socially    Drug use: No     Allergies   Allergen Reactions    Sulfa Antibiotics Rash     Prior to Admission medications    Medication Sig Start Date End Date Taking?  Authorizing Provider   Handicap Placard MISC by Does not apply route Cannot ambulate >200ft without assistance  Ex: 10/15/2025 10/15/20  Yes Mary Esteban MD   DULoxetine (CYMBALTA) 20 MG extended release capsule Take 1 capsule by mouth daily 10/15/20 Yes Luis Miguel Hollins MD   omeprazole (PRILOSEC) 20 MG delayed release capsule Take 1 capsule by mouth Daily take 1 capsule by mouth once daily 10/15/20  Yes Luis Miguel Hollins MD   lisinopril-hydroCHLOROthiazide (PRINZIDE;ZESTORETIC) 20-25 MG per tablet Take 1 tablet by mouth daily take 1 tablet by mouth once daily 10/15/20  Yes Luis Miguel Hollins MD   levothyroxine (SYNTHROID) 175 MCG tablet take 1 tablet by mouth once daily 10/15/20  Yes Luis Miguel Hollins MD   HYDROcodone-acetaminophen (NORCO) 5-325 MG per tablet Take 1 tablet by mouth every 6 hours as needed for Pain for up to 5 days.  Intended supply: 30 days 10/15/20 10/20/20 Yes Luis Miguel Hollins MD       ROS:  General: no new fever, chills, night sweats, weight changes      Ophthalmic: no new vision changes  ENT: no new headaches, sinus problems, URI symptoms  Cardiovascular: no new chest pain or dyspnea on exertion, palpitations  Respiratory: no new cough, shortness of breath  Gastrointestinal: no new abdominal pain, change in bowel habits, or black or bloody stools  Genito-Urinary: no new dysuria, trouble voiding, or hematuria  Endocrine: no new hot flashes, malaise/lethargy, polydipsia/polyuria, skin changes, temperature intolerance and unexpected weight changes   Musculoskeletal: no new  joint pain, muscle pain  Hematological and Lymphatic: no new bleeding problems  Dermatological: no new rash and skin lesion changes  Neurological: no new TIA or stroke symptoms  Psychological: +current depression or anxiety; no homicidal or suicidal ideation  Allergy and Immunology: no new nasal congestion, postnasal drip and seasonal allergies    Vitals:  BP (!) 138/90 (Site: Left Upper Arm, Position: Sitting, Cuff Size: Medium Adult)   Pulse 78   Temp 97.4 °F (36.3 °C) (Temporal)   Resp 18   Ht 5' 8.5\" (1.74 m)   Wt 261 lb (118.4 kg)   SpO2 96%   BMI 39.11 kg/m²   Wt Readings from Last 3 Encounters:   10/15/20 261 lb (118.4 kg)   08/20/19 223 lb (101.2 kg) Hyperlipidemia, unspecified hyperlipidemia type  Lipid Panel   5. Hypothyroidism, unspecified type  TSH without Reflex    levothyroxine (SYNTHROID) 175 MCG tablet   6. Gastroesophageal reflux disease, unspecified whether esophagitis present  omeprazole (PRILOSEC) 20 MG delayed release capsule   7. Obstructive sleep apnea syndrome     8. Deconditioned low back  Handicap Placard MISC   9. Need for vaccination     10. Healthcare maintenance  HIV-1 AND HIV-2 ANTIBODIES   11. Hypovitaminosis D  Vitamin D 25 Hydroxy   12. Screening for diabetes mellitus  POCT glycosylated hemoglobin (Hb A1C)   13. Reactive depression  DULoxetine (CYMBALTA) 20 MG extended release capsule       Concern for acute lumbar fracture - stat Xray  Labs as ordered  Stop NSAIDs  norco for acute pain  Start cymbalta for pain and mood  Close f/u      RTO: Return in about 2 months (around 12/15/2020) for f/u , HTN.       An electronic signature was used to authenticate this note.  ---- Yanci Cortes MD on 10/15/2020 at 2:42 PM

## 2020-11-04 ENCOUNTER — TELEPHONE (OUTPATIENT)
Dept: FAMILY MEDICINE CLINIC | Age: 55
End: 2020-11-04

## 2020-11-04 NOTE — TELEPHONE ENCOUNTER
Harman Morel from [address] [place] [address] would like to know if we can change CT to MRI, due to insurance purposes. Please advise.  Thank you

## 2020-12-06 ENCOUNTER — APPOINTMENT (OUTPATIENT)
Dept: GENERAL RADIOLOGY | Age: 55
End: 2020-12-06
Payer: COMMERCIAL

## 2020-12-06 ENCOUNTER — HOSPITAL ENCOUNTER (EMERGENCY)
Age: 55
Discharge: HOME | End: 2020-12-06
Payer: COMMERCIAL

## 2020-12-06 VITALS
HEART RATE: 78 BPM | OXYGEN SATURATION: 97 % | TEMPERATURE: 97.4 F | RESPIRATION RATE: 16 BRPM | SYSTOLIC BLOOD PRESSURE: 148 MMHG | DIASTOLIC BLOOD PRESSURE: 86 MMHG

## 2020-12-06 PROCEDURE — 99283 EMERGENCY DEPT VISIT LOW MDM: CPT

## 2020-12-06 PROCEDURE — 73630 X-RAY EXAM OF FOOT: CPT

## 2020-12-06 PROCEDURE — 73610 X-RAY EXAM OF ANKLE: CPT

## 2020-12-06 PROCEDURE — 6370000000 HC RX 637 (ALT 250 FOR IP): Performed by: PHYSICIAN ASSISTANT

## 2020-12-06 RX ORDER — IBUPROFEN 600 MG/1
600 TABLET ORAL EVERY 6 HOURS PRN
Qty: 40 TABLET | Refills: 0 | Status: SHIPPED | OUTPATIENT
Start: 2020-12-06 | End: 2022-01-11 | Stop reason: SDUPTHER

## 2020-12-06 RX ORDER — IBUPROFEN 600 MG/1
600 TABLET ORAL ONCE
Status: COMPLETED | OUTPATIENT
Start: 2020-12-06 | End: 2020-12-06

## 2020-12-06 RX ADMIN — IBUPROFEN 600 MG: 600 TABLET, FILM COATED ORAL at 14:01

## 2020-12-06 ASSESSMENT — PAIN DESCRIPTION - ORIENTATION: ORIENTATION: RIGHT

## 2020-12-06 ASSESSMENT — PAIN DESCRIPTION - PAIN TYPE: TYPE: ACUTE PAIN

## 2020-12-06 ASSESSMENT — PAIN SCALES - GENERAL: PAINLEVEL_OUTOF10: 8

## 2020-12-06 ASSESSMENT — PAIN DESCRIPTION - LOCATION: LOCATION: ANKLE

## 2020-12-06 NOTE — ED PROVIDER NOTES
[facility]                                                                                                                                    Department of Emergency Medicine   ED  Provider Note  Admit Date/RoomTime: 12/6/2020  1:49 PM  ED Room: 36/36        HPI:  12/6/20,   Time: 2:00 PM JHON Pa is a 50 y.o. male presenting to the ED for fall/ankle pain, beginning this AM.  The complaint has been persistent, moderate in severity, and worsened by walking. Pt states he rolled right foot/ankle on a step at home this AM.   Has pain in medial ankle and states he is unable to bear weight. Denies hitting his head. No right knee or hip pain. States he did not take anything for pain prior to arrival.         ROS:     Constitutional: Negative for fever and chills  HENT: Negative for ear pain, sore throat and sinus pressure  Eyes: Negative for pain, discharge and redness  Respiratory:  Negative for shortness of breath, cough and wheezing  Cardiovascular: Negative for CP, edema or palpitations  Gastrointestinal: Negative for nausea, vomiting, diarrhea and abdominal distention  Genitourinary: Negative for dysuria and frequency  Musculoskeletal:  See HPI  Skin: Negative for rash and wound  Neurological: Negative for weakness and headaches  Hematological: Negative for adenopathy    All other systems reviewed and are negative      -------------------------------- PAST HISTORY ----------------------------------  Past Medical History:  has a past medical history of GERD (gastroesophageal reflux disease), Hypertension, Hypothyroidism, Nephrolithiasis, Sleep apnea, and Thyroid disease. Past Surgical History:  has a past surgical history that includes Lithotripsy. Social History:  reports that he quit smoking about 2 years ago. His smoking use included cigarettes. He has a 7.50 pack-year smoking history. He has never used smokeless tobacco. He reports current alcohol use.  He reports that he does not use drugs. Family History: family history includes Cancer in his paternal aunt and paternal uncle; Cancer (age of onset: 77) in his father and mother; High Blood Pressure in his father and mother. The patients home medications have been reviewed. Allergies: Sulfa antibiotics    --------------------------------- RESULTS ------------------------------------------  All laboratory and radiology results have been personally reviewed by myself   LABS:  No results found for this visit on 12/06/20. RADIOLOGY:  Interpreted by Radiologist.  XR FOOT RIGHT (MIN 3 VIEWS)   Final Result   No acute osseous abnormality. XR ANKLE RIGHT (MIN 3 VIEWS)   Final Result   No acute bony abnormalities are noted          ----------------- NURSING NOTES AND VITALS REVIEWED ---------------   The nursing notes within the ED encounter and vital signs as below have been reviewed. BP (!) 148/86   Pulse 78   Temp 97.4 °F (36.3 °C) (Tympanic)   Resp 16   SpO2 97%   Oxygen Saturation Interpretation: Normal      --------------------------------PHYSICAL EXAM------------------------------------      Constitutional/General: Alert and oriented x3, well appearing, non toxic in NAD  Head: NC/AT  Eyes: PERRL, EOMI  Mouth: Oropharynx clear, handling secretions, no trismus  Neck: Supple, full ROM, no meningeal signs  Pulmonary: Lungs clear to auscultation bilaterally, no wheezes, rales, or rhonchi. Not in respiratory distress  Cardiovascular:  Regular rate and rhythm, no murmurs, gallops, or rubs. 2+ distal pulses  Abdomen: Soft, + BS. No distension. Nontender. No palpable rigidity, rebound or guarding  Extremities: Moves all extremities x 4. Warm and well perfused  Skin: warm and dry without rash  Neurologic: GCS 15,  Intact.   No focal deficits  Psych: Normal Affect      ------------------------ ED COURSE/MEDICAL DECISION MAKING----------------------  Medications   ibuprofen (ADVIL;MOTRIN) tablet 600 mg (600 mg Oral Given 12/6/20 1401)         Medical Decision Making:    No acute fracture or bony changes identified. The patient is reassured. Return to put him in an Aircast and sent home with and anti-inflammatories. Follow-up with podiatry or Ortho as needed for any persistent or ongoing concerns. Patient is aware and agreeable to this plan       Counseling: The emergency provider has spoken with the patient and discussed todays results, in addition to providing specific details for the plan of care and counseling regarding the diagnosis and prognosis. Questions are answered at this time and they are agreeable with the plan.      ------------------------ IMPRESSION AND DISPOSITION -------------------------------    IMPRESSION  1.  Sprain of right ankle, unspecified ligament, initial encounter        DISPOSITION  Disposition: Discharge to home  Patient condition is stable                   Heron Guerrier PA-C  12/06/20 9320

## 2021-01-05 ENCOUNTER — OFFICE VISIT (OUTPATIENT)
Dept: FAMILY MEDICINE CLINIC | Age: 56
End: 2021-01-05
Payer: COMMERCIAL

## 2021-01-05 VITALS
SYSTOLIC BLOOD PRESSURE: 130 MMHG | OXYGEN SATURATION: 97 % | DIASTOLIC BLOOD PRESSURE: 92 MMHG | HEIGHT: 69 IN | BODY MASS INDEX: 41.03 KG/M2 | TEMPERATURE: 97.2 F | HEART RATE: 72 BPM | WEIGHT: 277 LBS

## 2021-01-05 DIAGNOSIS — E03.9 HYPOTHYROIDISM, UNSPECIFIED TYPE: ICD-10-CM

## 2021-01-05 DIAGNOSIS — M54.50 MIDLINE LOW BACK PAIN, UNSPECIFIED CHRONICITY, UNSPECIFIED WHETHER SCIATICA PRESENT: ICD-10-CM

## 2021-01-05 DIAGNOSIS — F32.A DEPRESSION, UNSPECIFIED DEPRESSION TYPE: ICD-10-CM

## 2021-01-05 DIAGNOSIS — L40.9 PSORIASIS: ICD-10-CM

## 2021-01-05 DIAGNOSIS — R06.02 SHORTNESS OF BREATH: Primary | ICD-10-CM

## 2021-01-05 DIAGNOSIS — E78.5 HYPERLIPIDEMIA, UNSPECIFIED HYPERLIPIDEMIA TYPE: ICD-10-CM

## 2021-01-05 DIAGNOSIS — E55.9 HYPOVITAMINOSIS D: ICD-10-CM

## 2021-01-05 DIAGNOSIS — Z00.00 HEALTHCARE MAINTENANCE: ICD-10-CM

## 2021-01-05 DIAGNOSIS — I10 ESSENTIAL HYPERTENSION: ICD-10-CM

## 2021-01-05 LAB
BASOPHILS ABSOLUTE: 0.03 E9/L (ref 0–0.2)
BASOPHILS RELATIVE PERCENT: 0.5 % (ref 0–2)
EOSINOPHILS ABSOLUTE: 0.22 E9/L (ref 0.05–0.5)
EOSINOPHILS RELATIVE PERCENT: 3.4 % (ref 0–6)
HCT VFR BLD CALC: 49 % (ref 37–54)
HEMOGLOBIN: 16.5 G/DL (ref 12.5–16.5)
IMMATURE GRANULOCYTES #: 0.01 E9/L
IMMATURE GRANULOCYTES %: 0.2 % (ref 0–5)
LYMPHOCYTES ABSOLUTE: 1.84 E9/L (ref 1.5–4)
LYMPHOCYTES RELATIVE PERCENT: 28.4 % (ref 20–42)
MCH RBC QN AUTO: 33.5 PG (ref 26–35)
MCHC RBC AUTO-ENTMCNC: 33.7 % (ref 32–34.5)
MCV RBC AUTO: 99.6 FL (ref 80–99.9)
MONOCYTES ABSOLUTE: 0.56 E9/L (ref 0.1–0.95)
MONOCYTES RELATIVE PERCENT: 8.6 % (ref 2–12)
NEUTROPHILS ABSOLUTE: 3.82 E9/L (ref 1.8–7.3)
NEUTROPHILS RELATIVE PERCENT: 58.9 % (ref 43–80)
PDW BLD-RTO: 13.1 FL (ref 11.5–15)
PLATELET # BLD: 232 E9/L (ref 130–450)
PMV BLD AUTO: 10.8 FL (ref 7–12)
RBC # BLD: 4.92 E12/L (ref 3.8–5.8)
WBC # BLD: 6.5 E9/L (ref 4.5–11.5)

## 2021-01-05 PROCEDURE — 99214 OFFICE O/P EST MOD 30 MIN: CPT | Performed by: FAMILY MEDICINE

## 2021-01-05 PROCEDURE — 93000 ELECTROCARDIOGRAM COMPLETE: CPT | Performed by: FAMILY MEDICINE

## 2021-01-05 RX ORDER — ESCITALOPRAM OXALATE 5 MG/1
5 TABLET ORAL DAILY
Qty: 30 TABLET | Refills: 3 | Status: SHIPPED
Start: 2021-01-05 | End: 2021-02-05 | Stop reason: SDUPTHER

## 2021-01-05 ASSESSMENT — PATIENT HEALTH QUESTIONNAIRE - PHQ9
SUM OF ALL RESPONSES TO PHQ QUESTIONS 1-9: 2
1. LITTLE INTEREST OR PLEASURE IN DOING THINGS: 1

## 2021-01-05 NOTE — PROGRESS NOTES
CC: Reny Suarez is a 50 y.o. 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/l  Had both hips replac  Interval hx:  Xray OA; did not complete advanced imaging; never got call from [facility]     RYLEY- not on CPAP  Cannot wear mask     Hypothyroidism  Adherent with 175mcg  He has been gaining weight recently, same     Depression  Family concerns causing stress  Not taking cymbalta - upset the stomach    F/u acute illness / SOB  Wife lost taste with viral illness about 3 months ago; he had bad headaches  Breathing not right since then  This has been about 3 months in total  Feels like he doesn't have enough oxygen at times  Sometimes it happens when sitting and other times with going down steps  Stopped smoking 3 years ago  All started after this illness  No orthopnea or PND; no leg swelling    Rash on R shin  Not itching or painful  Round spots on the R shin  Total 3-4 months    ROS negative unless otherwise noted    Vitals:   BP (!) 130/92 (Site: Left Upper Arm, Position: Sitting, Cuff Size: Medium Adult)   Pulse 72   Temp 97.2 °F (36.2 °C) (Temporal)   Ht 5' 8.5\" (1.74 m)   Wt 277 lb (125.6 kg)   SpO2 97%   BMI 41.51 kg/m²   Wt Readings from Last 3 Encounters:   01/05/21 277 lb (125.6 kg)   10/15/20 261 lb (118.4 kg)   08/20/19 223 lb (101.2 kg)       PE:  Constitutional - alert, well appearing, and in no distress  Eyes - extraocular eye movements intact, left eye normal, right eye normal, no conjunctivitis noted  Neck - symmetric, no obvious masses noted Respiratory- clear to auscultation, no wheezes, rales or rhonchi, symmetric air entry; no increased work of breathing  Cardiovascular - normal rate, regular rhythm, normal S1, S2, no murmurs, rubs, clicks or gallops  Extremities - no edema noted  Abdomen - soft, nontender, nondistended  Skin - normal coloration and turgor, annular erythematous lesions on the R shin with some central clearing and central scaling which is white  Neurological - no obvious CN deficits or focal neurological deficits  Psychiatric - alert, oriented; normal mood, behavior, speech, dress    A / P:     Diagnosis Orders   1. Shortness of breath  ECHO Complete 2D W Doppler W Color    Stress test, lexiscan    XR CHEST STANDARD (2 VW)    EKG 12 lead    EKG 12 lead   2. Psoriasis  hydrocortisone 2.5 % cream   3. Depression, unspecified depression type  escitalopram (LEXAPRO) 5 MG tablet   4. Midline low back pain, unspecified chronicity, unspecified whether sciatica present     5. Hypothyroidism, unspecified type     6. Essential hypertension     7. Hyperlipidemia, unspecified hyperlipidemia type       Continue with plans for MRI lumbar spine for severe and persistent low back pain  Stop cymbalta and start lexapro 5mg for now; close f/u  Obtain previously ordered labs today  EKG NSR unchanged from prior  Echo and stress as well for SOB and recent viral illness  CXR as well for sob    RTO: Return in about 1 month (around 2/5/2021) for mood disorder f/u; med check VV . On this date 01/05/21 I have spent 33 minutes reviewing previous notes, test results and face to face with the patient discussing the diagnosis and importance of compliance with the treatment plan.      An electronic signature was used to authenticate this note.  ---- Olga Krueger MD on 1/5/2021 at 9:22 AM

## 2021-01-06 LAB
ALBUMIN SERPL-MCNC: 4.3 G/DL (ref 3.5–5.2)
ALP BLD-CCNC: 64 U/L (ref 40–129)
ALT SERPL-CCNC: 74 U/L (ref 0–40)
ANION GAP SERPL CALCULATED.3IONS-SCNC: 16 MMOL/L (ref 7–16)
AST SERPL-CCNC: 82 U/L (ref 0–39)
BILIRUB SERPL-MCNC: 0.7 MG/DL (ref 0–1.2)
BUN BLDV-MCNC: 19 MG/DL (ref 6–20)
CALCIUM SERPL-MCNC: 9.8 MG/DL (ref 8.6–10.2)
CHLORIDE BLD-SCNC: 95 MMOL/L (ref 98–107)
CHOLESTEROL, TOTAL: 212 MG/DL (ref 0–199)
CO2: 26 MMOL/L (ref 22–29)
CREAT SERPL-MCNC: 1 MG/DL (ref 0.7–1.2)
GFR AFRICAN AMERICAN: >60
GFR NON-AFRICAN AMERICAN: >60 ML/MIN/1.73
GLUCOSE BLD-MCNC: 116 MG/DL (ref 74–99)
HDLC SERPL-MCNC: 47 MG/DL
LDL CHOLESTEROL CALCULATED: 125 MG/DL (ref 0–99)
POTASSIUM SERPL-SCNC: 5.1 MMOL/L (ref 3.5–5)
SODIUM BLD-SCNC: 137 MMOL/L (ref 132–146)
TOTAL PROTEIN: 8.1 G/DL (ref 6.4–8.3)
TRIGL SERPL-MCNC: 198 MG/DL (ref 0–149)
TSH SERPL DL<=0.05 MIU/L-ACNC: 12.29 UIU/ML (ref 0.27–4.2)
VITAMIN D 25-HYDROXY: 48 NG/ML (ref 30–100)
VLDLC SERPL CALC-MCNC: 40 MG/DL

## 2021-01-07 DIAGNOSIS — E03.9 HYPOTHYROIDISM, UNSPECIFIED TYPE: ICD-10-CM

## 2021-01-07 RX ORDER — LEVOTHYROXINE SODIUM 0.2 MG/1
200 TABLET ORAL DAILY
Qty: 90 TABLET | Refills: 1 | Status: SHIPPED
Start: 2021-01-07 | End: 2021-02-11 | Stop reason: SDUPTHER

## 2021-01-12 LAB — HIV-1 AND HIV-2 ANTIBODIES: NORMAL

## 2021-01-20 ENCOUNTER — TELEPHONE (OUTPATIENT)
Dept: CARDIOLOGY | Age: 56
End: 2021-01-20

## 2021-01-21 ENCOUNTER — TELEPHONE (OUTPATIENT)
Dept: CARDIOLOGY | Age: 56
End: 2021-01-21

## 2021-01-21 NOTE — TELEPHONE ENCOUNTER
SPOKE WITH PATIENT THIS MORNING CONCERNING HIS STRESS TEST. REVIEWED INSTRUCTIONS FOR THE STRESS TEST. REVIEWED PRE-PROCEDURE COVID CHECKLIST WITH PATIENT AND PATIENT CONFIRMED HE UNDERSTOOD THE INSTRUCTIONS.

## 2021-01-22 ENCOUNTER — HOSPITAL ENCOUNTER (OUTPATIENT)
Dept: CARDIOLOGY | Age: 56
Discharge: HOME | End: 2021-01-22
Payer: COMMERCIAL

## 2021-01-22 VITALS
BODY MASS INDEX: 41.03 KG/M2 | WEIGHT: 277 LBS | SYSTOLIC BLOOD PRESSURE: 122 MMHG | RESPIRATION RATE: 18 BRPM | TEMPERATURE: 98 F | HEART RATE: 84 BPM | HEIGHT: 69 IN | DIASTOLIC BLOOD PRESSURE: 70 MMHG

## 2021-01-22 DIAGNOSIS — R06.02 SHORTNESS OF BREATH: ICD-10-CM

## 2021-01-22 DIAGNOSIS — R06.02 SHORTNESS OF BREATH: Primary | ICD-10-CM

## 2021-01-22 LAB
LV EF: 65 %
LVEF MODALITY: NORMAL

## 2021-01-22 PROCEDURE — A9500 TC99M SESTAMIBI: HCPCS | Performed by: INTERNAL MEDICINE

## 2021-01-22 PROCEDURE — 93017 CV STRESS TEST TRACING ONLY: CPT

## 2021-01-22 PROCEDURE — 78452 HT MUSCLE IMAGE SPECT MULT: CPT

## 2021-01-22 PROCEDURE — 6360000002 HC RX W HCPCS: Performed by: INTERNAL MEDICINE

## 2021-01-22 PROCEDURE — 93306 TTE W/DOPPLER COMPLETE: CPT | Performed by: PSYCHIATRY & NEUROLOGY

## 2021-01-22 PROCEDURE — 2580000003 HC RX 258: Performed by: INTERNAL MEDICINE

## 2021-01-22 PROCEDURE — 3430000000 HC RX DIAGNOSTIC RADIOPHARMACEUTICAL: Performed by: INTERNAL MEDICINE

## 2021-01-22 RX ORDER — M-VIT,TX,IRON,MINS/CALC/FOLIC 27MG-0.4MG
1 TABLET ORAL DAILY
COMMUNITY
End: 2021-10-07

## 2021-01-22 RX ORDER — SODIUM CHLORIDE 0.9 % (FLUSH) 0.9 %
10 SYRINGE (ML) INJECTION PRN
Status: DISCONTINUED | OUTPATIENT
Start: 2021-01-22 | End: 2021-01-23 | Stop reason: HOSPADM

## 2021-01-22 RX ADMIN — SODIUM CHLORIDE, PRESERVATIVE FREE 10 ML: 5 INJECTION INTRAVENOUS at 09:37

## 2021-01-22 RX ADMIN — REGADENOSON 0.4 MG: 0.08 INJECTION, SOLUTION INTRAVENOUS at 09:37

## 2021-01-22 RX ADMIN — Medication 31.5 MILLICURIE: at 09:37

## 2021-01-22 RX ADMIN — SODIUM CHLORIDE, PRESERVATIVE FREE 10 ML: 5 INJECTION INTRAVENOUS at 09:38

## 2021-01-22 RX ADMIN — Medication 9.7 MILLICURIE: at 08:12

## 2021-01-22 RX ADMIN — SODIUM CHLORIDE, PRESERVATIVE FREE 10 ML: 5 INJECTION INTRAVENOUS at 08:12

## 2021-01-22 NOTE — PROCEDURES
[address],[place] [address] and [facility] [address] [facility]   [address] [facility] [address] [facility]  [phone]                Pharmacologic Stress Nuclear Gated SPECT Study    Name: [address] Account Number: [de-identified]    :  1965          Sex: male         Date of Study:  2021    Height: 5' 8.5\" (174 cm)         Weight: 277 lb (125.6 kg)     Ordering Provider: Vaibhav Francisco MD          PCP: Vaibhav Francisco MD      Cardiologist: none             Interpreting Physician: Pedro Luis Villar, DO  _________________________________________________________________________________    Indication:   Detecting the presence and location of coronary artery disease    Clinical History:   Patient has no known history of coronary artery disease. Resting ECG:    Normal sinus rhythm and Nonspecific ST-T wave changes    Procedure:   Pharmacologic stress testing was performed with regadenoson 0.4 mg for 15 seconds. Additionally, low-level exercise was performed along with the infusion. The heart rate was 84 at baseline and rachel to 116 beats during the infusion. This corresponds to 69% of maximum predicted heart rate. The blood pressure at baseline was 122/70 and blood pressure at the end of infusion was 120/74. Blood pressure response was normal during the stress procedure. The patient experienced SOB during the infusion. ECG during the infusion did not change. IMAGING: Myocardial perfusion imaging was performed at rest 30-35 minutes following the intravenous injection of 9.7 mCi of (Tc-Sestamibi) followed by 10 ml of Normal Saline. As per infusion protocol, the patient was injected intravenously with 31.5 mCi of (Tc-Sestamibi) followed by 10 ml of Normal Saline. Gated post-stress tomographic imaging was performed 20-25 minutes after stress. FINDINGS: The overall quality of the study was good.      Left ventricular cavity size was noted to be normal.    Rotational analog analysis demonstrated soft tissue diaphragmatic attenuation. The gated SPECT stress imaging in the short, vertical long, and horizontal long axis demonstrated normal homogeneous tracer distribution throughout the myocardium. The resting images are normal.     Gated SPECT left ventricular ejection fraction was calculated to be 73%, with normal myocardial thickening and wall motion. Impression:    1. ECG during the infusion did not change. 2. The myocardial perfusion imaging was normal with attenuation artifact. 3. Overall left ventricular systolic function was normal without regional wall motion abnormalities. 4. Low risk general pharmacologic stress test.    Thank you for sending your patient to this [employer].      Electronically signed by Leeroy Johnson DO on 1/22/21 at 12:09 PM EST

## 2021-02-03 DIAGNOSIS — R06.02 SHORTNESS OF BREATH: ICD-10-CM

## 2021-02-05 DIAGNOSIS — E03.9 HYPOTHYROIDISM, UNSPECIFIED TYPE: ICD-10-CM

## 2021-02-05 PROBLEM — M54.50 CHRONIC MIDLINE LOW BACK PAIN: Status: ACTIVE | Noted: 2021-02-05

## 2021-02-05 PROBLEM — F32.A DEPRESSION: Status: ACTIVE | Noted: 2021-02-05

## 2021-02-05 PROBLEM — L40.9 PSORIASIS: Status: ACTIVE | Noted: 2021-02-05

## 2021-02-05 PROBLEM — G89.29 CHRONIC MIDLINE LOW BACK PAIN: Status: ACTIVE | Noted: 2021-02-05

## 2021-02-05 LAB — TSH SERPL DL<=0.05 MIU/L-ACNC: 13.26 UIU/ML (ref 0.27–4.2)

## 2021-02-05 RX ORDER — LEVOTHYROXINE SODIUM 0.05 MG/1
50 TABLET ORAL DAILY
Qty: 90 TABLET | Refills: 1 | Status: SHIPPED
Start: 2021-02-05 | End: 2021-07-15

## 2021-02-10 ENCOUNTER — TELEPHONE (OUTPATIENT)
Dept: FAMILY MEDICINE CLINIC | Age: 56
End: 2021-02-10

## 2021-02-10 NOTE — TELEPHONE ENCOUNTER
Patient confused on hi Thyroid medication-thought he was going to have dose increased but instead it was decreased to 50 mcg. Requesting a call back.

## 2021-02-11 DIAGNOSIS — E03.9 HYPOTHYROIDISM, UNSPECIFIED TYPE: ICD-10-CM

## 2021-02-11 RX ORDER — LEVOTHYROXINE SODIUM 0.2 MG/1
200 TABLET ORAL DAILY
Qty: 90 TABLET | Refills: 1 | Status: SHIPPED
Start: 2021-02-11 | End: 2021-08-17 | Stop reason: SDUPTHER

## 2021-02-11 NOTE — TELEPHONE ENCOUNTER
TSH is still under-dosed. Dr. Chelita Pate sent in an additional 50mcg to pharmacy.   Take a total 250mcg total

## 2021-02-11 NOTE — TELEPHONE ENCOUNTER
Spoke with pt to clarify dose of levothyroxine, he states he thinks his wife threw away his 200 mcg. Recent to pharmacy.

## 2021-05-28 ENCOUNTER — OFFICE VISIT (OUTPATIENT)
Dept: FAMILY MEDICINE CLINIC | Age: 56
End: 2021-05-28
Payer: COMMERCIAL

## 2021-05-28 VITALS
SYSTOLIC BLOOD PRESSURE: 122 MMHG | BODY MASS INDEX: 40.91 KG/M2 | RESPIRATION RATE: 16 BRPM | WEIGHT: 273 LBS | TEMPERATURE: 98.1 F | HEART RATE: 86 BPM | DIASTOLIC BLOOD PRESSURE: 88 MMHG | OXYGEN SATURATION: 95 %

## 2021-05-28 DIAGNOSIS — E11.9 DIABETES MELLITUS, NEW ONSET (HCC): Primary | ICD-10-CM

## 2021-05-28 DIAGNOSIS — E03.9 HYPOTHYROIDISM, UNSPECIFIED TYPE: ICD-10-CM

## 2021-05-28 DIAGNOSIS — E11.9 DIABETES MELLITUS, NEW ONSET (HCC): ICD-10-CM

## 2021-05-28 LAB
ALBUMIN SERPL-MCNC: 4 G/DL (ref 3.5–5.2)
ALP BLD-CCNC: 89 U/L (ref 40–129)
ALT SERPL-CCNC: 72 U/L (ref 0–40)
ANION GAP SERPL CALCULATED.3IONS-SCNC: 15 MMOL/L (ref 7–16)
AST SERPL-CCNC: 113 U/L (ref 0–39)
BASOPHILS ABSOLUTE: 0.04 E9/L (ref 0–0.2)
BASOPHILS RELATIVE PERCENT: 0.6 % (ref 0–2)
BILIRUB SERPL-MCNC: 1.1 MG/DL (ref 0–1.2)
BILIRUBIN, POC: NORMAL
BLOOD URINE, POC: NORMAL
BUN BLDV-MCNC: 20 MG/DL (ref 6–20)
CALCIUM SERPL-MCNC: 10.1 MG/DL (ref 8.6–10.2)
CHLORIDE BLD-SCNC: 97 MMOL/L (ref 98–107)
CHP ED QC CHECK: NORMAL
CLARITY, POC: CLEAR
CO2: 27 MMOL/L (ref 22–29)
COLOR, POC: NORMAL
CREAT SERPL-MCNC: 0.9 MG/DL (ref 0.7–1.2)
EOSINOPHILS ABSOLUTE: 0.11 E9/L (ref 0.05–0.5)
EOSINOPHILS RELATIVE PERCENT: 1.6 % (ref 0–6)
GFR AFRICAN AMERICAN: >60
GFR NON-AFRICAN AMERICAN: >60 ML/MIN/1.73
GLUCOSE BLD-MCNC: 277 MG/DL
GLUCOSE BLD-MCNC: 330 MG/DL (ref 74–99)
GLUCOSE URINE, POC: NORMAL
HBA1C MFR BLD: 10.4 %
HCT VFR BLD CALC: 48.7 % (ref 37–54)
HEMOGLOBIN: 16.1 G/DL (ref 12.5–16.5)
IMMATURE GRANULOCYTES #: 0.02 E9/L
IMMATURE GRANULOCYTES %: 0.3 % (ref 0–5)
KETONES, POC: NORMAL
LEUKOCYTE EST, POC: NORMAL
LYMPHOCYTES ABSOLUTE: 2.11 E9/L (ref 1.5–4)
LYMPHOCYTES RELATIVE PERCENT: 30.8 % (ref 20–42)
MCH RBC QN AUTO: 32.4 PG (ref 26–35)
MCHC RBC AUTO-ENTMCNC: 33.1 % (ref 32–34.5)
MCV RBC AUTO: 98 FL (ref 80–99.9)
MONOCYTES ABSOLUTE: 0.69 E9/L (ref 0.1–0.95)
MONOCYTES RELATIVE PERCENT: 10.1 % (ref 2–12)
NEUTROPHILS ABSOLUTE: 3.88 E9/L (ref 1.8–7.3)
NEUTROPHILS RELATIVE PERCENT: 56.6 % (ref 43–80)
NITRITE, POC: NORMAL
PDW BLD-RTO: 13.8 FL (ref 11.5–15)
PH, POC: 6.5
PLATELET # BLD: 208 E9/L (ref 130–450)
PMV BLD AUTO: 11.4 FL (ref 7–12)
POTASSIUM SERPL-SCNC: 4.8 MMOL/L (ref 3.5–5)
PROTEIN, POC: NORMAL
RBC # BLD: 4.97 E12/L (ref 3.8–5.8)
SODIUM BLD-SCNC: 139 MMOL/L (ref 132–146)
SPECIFIC GRAVITY, POC: 1.02
TOTAL PROTEIN: 9 G/DL (ref 6.4–8.3)
TSH SERPL DL<=0.05 MIU/L-ACNC: 20.16 UIU/ML (ref 0.27–4.2)
UROBILINOGEN, POC: 1
WBC # BLD: 6.9 E9/L (ref 4.5–11.5)

## 2021-05-28 PROCEDURE — 81002 URINALYSIS NONAUTO W/O SCOPE: CPT | Performed by: PHYSICIAN ASSISTANT

## 2021-05-28 PROCEDURE — 83036 HEMOGLOBIN GLYCOSYLATED A1C: CPT | Performed by: PHYSICIAN ASSISTANT

## 2021-05-28 PROCEDURE — 99214 OFFICE O/P EST MOD 30 MIN: CPT | Performed by: PHYSICIAN ASSISTANT

## 2021-05-28 PROCEDURE — 82962 GLUCOSE BLOOD TEST: CPT | Performed by: PHYSICIAN ASSISTANT

## 2021-05-28 RX ORDER — BLOOD-GLUCOSE METER
KIT MISCELLANEOUS
Qty: 1 KIT | Refills: 0 | Status: SHIPPED
Start: 2021-05-28 | End: 2021-06-04 | Stop reason: SDUPTHER

## 2021-05-28 NOTE — PROGRESS NOTES
Chief Complaint:  Urinary Frequency (5-6 times a night, has to run to the bathroom during the day to not have an accident)    History of Present Illness:  Source of history provided by:  patient. - Patient presents for urinary frequency  - Up many times at night to urinate and running to the bathroom all day as soon as he drinks  - He has always had some urinary frequency but has been worsening over the last few weeks  - Denies any hematuria or dysuria  - 3+ glucose in the urine today  - He has been fasting since 6pm last night  - Denies personal or family history of DM  - Has been having a hard time losing weight    - Levothyroxine currently being adjusted as TSH was out of range  - No recent labs  - A1c in 10/2020 was 5.6  - Denies new back pain, fever, chills, abdominal pain, NVD, or lethargy    Review of Systems: Unless otherwise stated in this report or unable to obtain because of the patient's clinical or mental status as evidenced by the medical record, this patients's positive and negative responses for Review of Systems, constitutional, psych, eyes, ENT, cardiovascular, respiratory, gastrointestinal, neurological, genitourinary, musculoskeletal, integument systems and systems related to the presenting problem are either stated in the preceding or were not pertinent or were negative for the symptoms and/or complaints related to the medical problem. Past Medical History:  has a past medical history of GERD (gastroesophageal reflux disease), Hypertension, Hypothyroidism, Nephrolithiasis, Sleep apnea, and Thyroid disease. Past Surgical History:  has a past surgical history that includes Lithotripsy. Social History:  reports that he quit smoking about 3 years ago. His smoking use included cigarettes. He has a 7.50 pack-year smoking history. He has never used smokeless tobacco. He reports current alcohol use. He reports that he does not use drugs.   Family History: family history includes Cancer in his paternal aunt and paternal uncle; Cancer (age of onset: 77) in his father and mother; High Blood Pressure in his father and mother. Allergies: Sulfa antibiotics    Physical Exam:  (Vital signs reviewed) /88   Pulse 86   Temp 98.1 °F (36.7 °C)   Resp 16   Wt 273 lb (123.8 kg)   SpO2 95%   BMI 40.91 kg/m²   Oxygen Saturation Interpretation: Normal.   Constitutional:  Alert, development consistent with age. Eyes:  PERRL, EOMI, no discharge or conjunctival injection. Ears:  External ears without lesions. Neck:  Normal ROM. Supple. Lungs:  Clear to auscultation and breath sounds equal.  Heart:  Regular rate and rhythm, normal heart sounds, without pathological murmurs, ectopy, gallops, or rubs. Abdomen:  Soft, nontender, good bowel sounds. No firm or pulsatile mass. Back:  No costovertebral tenderness. Skin:  Normal turgor. Warm, dry, without visible rash, unless noted elsewhere. Neurological:  Alert and oriented. Motor functions intact.    ------------------------------------------Test Results Section----------------------------------------------  (All laboratory and radiology results have been personally reviewed by myself)  Laboratory:  Results for orders placed or performed in visit on 05/28/21   POCT Glucose   Result Value Ref Range    Glucose 277 mg/dL    QC OK? POCT glycosylated hemoglobin (Hb A1C)   Result Value Ref Range    Hemoglobin A1C 10.4 %   POCT Urinalysis no Micro   Result Value Ref Range    Color, UA dk yellow     Clarity, UA clear     Glucose, UA POC 3+     Bilirubin, UA neg     Ketones, UA trace     Spec Grav, UA 1.020     Blood, UA POC neg     pH, UA 6.5     Protein, UA POC 1+     Urobilinogen, UA 1.0     Leukocytes, UA neg     Nitrite, UA neg      Radiology: All Radiology results interpreted by Radiologist unless otherwise noted.     -------------------------------------Impression & Disposition Section-----------------------------------  Impression(s):  Franco Acuña was seen today for urinary frequency. Diagnoses and all orders for this visit:    Diabetes mellitus, new onset ([facility] [address]  -     POCT Glucose  -     POCT glycosylated hemoglobin (Hb A1C)  -     POCT Urinalysis no Micro  -     metFORMIN (GLUCOPHAGE) 500 MG tablet; Take 1 tablet by mouth 2 times daily (with meals)  -     CBC Auto Differential; Future  -     Comprehensive Metabolic Panel; Future  -     Blood Glucose Monitoring Suppl (ONE TOUCH ULTRA MINI) w/Device KIT; Check blood sugar bid    Hypothyroidism, unspecified type  -     TSH without Reflex; Future    - A1c 10.4 today  - New onset DM  - Patient wishes to return with wife to review next steps when she is here  - Will send kit to pharmacy and start metformin 500mg bid for now  - Discussed watching white starches, sugars, and beer intake in the meantime  - Recommended keeping food log and bringing with him to next appointment with glucometer   - Return in 1-2 weeks for DM and education when wife is present     More than 30 minutes of face-to-face time was spent with the patient during the encounter, during which more than half of that time was spent counseling and/or coordinating his care.

## 2021-05-28 NOTE — PATIENT INSTRUCTIONS
Patient Education        Learning About Meal Planning for Diabetes  Why plan your meals? Meal planning can be a key part of managing diabetes. Planning meals and snacks with the right balance of carbohydrate, protein, and fat can help you keep your blood sugar at the target level you set with your doctor. You don't have to eat special foods. You can eat what your family eats, including sweets once in a while. But you do have to pay attention to how often you eat and how much you eat of certain foods. You may want to work with a dietitian or a certified diabetes educator. He or she can give you tips and meal ideas and can answer your questions about meal planning. This health professional can also help you reach a healthy weight if that is one of your goals. What plan is right for you? Your dietitian or diabetes educator may suggest that you start with the plate format or carbohydrate counting. The plate format  The plate format is a simple way to help you manage how you eat. You plan meals by learning how much space each food should take on a plate. Using the plate format helps you spread carbohydrate throughout the day. It can make it easier to keep your blood sugar level within your target range. It also helps you see if you're eating healthy portion sizes. To use the plate format, you put non-starchy vegetables on half your plate. Add meat or meat substitutes on one-quarter of the plate. Put a grain or starchy vegetable (such as brown rice or a potato) on the final quarter of the plate. You can add a small piece of fruit and some low-fat or fat-free milk or yogurt, depending on your carbohydrate goal for each meal.  Here are some tips for using the plate format:  · Make sure that you are not using an oversized plate. A 9-inch plate is best. Many restaurants use larger plates. · Get used to using the plate format at home. Then you can use it when you eat out.   · Write down your questions about using the plate format. Talk to your doctor, a dietitian, or a diabetes educator about your concerns. Carbohydrate counting  With carbohydrate counting, you plan meals based on the amount of carbohydrate in each food. Carbohydrate raises blood sugar higher and more quickly than any other nutrient. It is found in desserts, breads and cereals, and fruit. It's also found in starchy vegetables such as potatoes and corn, grains such as rice and pasta, and milk and yogurt. Spreading carbohydrate throughout the day helps keep your blood sugar levels within your target range. Your daily amount depends on several things, including your weight, how active you are, which diabetes medicines you take, and what your goals are for your blood sugar levels. A registered dietitian or diabetes educator can help you plan how much carbohydrate to include in each meal and snack. A guideline for your daily amount of carbohydrate is:  · 45 to 60 grams at each meal. That's about the same as 3 to 4 carbohydrate servings. · 15 to 20 grams at each snack. That's about the same as 1 carbohydrate serving. The Nutrition Facts label on packaged foods tells you how much carbohydrate is in a serving of the food. First, look at the serving size on the food label. Is that the amount you eat in a serving? All of the nutrition information on a food label is based on that serving size. So if you eat more or less than that, you'll need to adjust the other numbers. Total carbohydrate is the next thing you need to look for on the label. If you count carbohydrate servings, one serving of carbohydrate is 15 grams. For foods that don't come with labels, such as fresh fruits and vegetables, you'll need a guide that lists carbohydrate in these foods. Ask your doctor, dietitian, or diabetes educator about books or other nutrition guides you can use.   If you take insulin, you need to know how many grams of carbohydrate are in a meal. This lets you know how much rapid-acting insulin to take before you eat. If you use an insulin pump, you get a constant rate of insulin during the day. So the pump must be programmed at meals to give you extra insulin to cover the rise in blood sugar after meals. When you know how much carbohydrate you will eat, you can take the right amount of insulin. Or, if you always use the same amount of insulin, you need to make sure that you eat the same amount of carbohydrate at meals. If you need more help to understand carbohydrate counting and food labels, ask your doctor, dietitian, or diabetes educator. How can you plan healthy meals? Here are some tips to get started:  · Plan your meals a week at a time. Don't forget to include snacks too. · Use cookbooks or online recipes to plan several main meals. Plan some quick meals for busy nights. You also can double some recipes that freeze well. Then you can save half for other busy nights when you don't have time to cook. · Make sure you have the ingredients you need for your recipes. If you're running low on basic items, put these items on your shopping list too. · List foods that you use to make breakfasts, lunches, and snacks. List plenty of fruits and vegetables. · Post this list on the refrigerator. Add to it as you think of more things you need. · Take the list to the store to do your weekly shopping. Follow-up care is a key part of your treatment and safety. Be sure to make and go to all appointments, and call your doctor if you are having problems. It's also a good idea to know your test results and keep a list of the medicines you take. Where can you learn more? Go to [email or web address] and sign in to your [employer] account. Enter E757 in the Hitlab box to learn more about \"Learning About Meal Planning for Diabetes. \"     If you do not have an account, please click on the \"Sign Up Now\" link.   Current as of: August 31, 2020               Content your doctor if:    · You often have problems controlling your blood sugar.     · You have symptoms of long-term diabetes problems, such as:  ? New vision changes. ? New pain, numbness, or tingling in your hands or feet. ? Skin problems. Where can you learn more? Go to https://chpepiceweb.[email or web address] org and sign in to your [employer] account. Enter C553 in the Sevenpop box to learn more about \"Type 2 Diabetes: Care Instructions. \"     If you do not have an account, please click on the \"Sign Up Now\" link. Current as of: August 31, 2020               Content Version: 12.8  © 8637-6735 Healthwise, Incorporated. Care instructions adapted under license by [facility] ([facility]). If you have questions about a medical condition or this instruction, always ask your healthcare professional. [address] any warranty or liability for your use of this information.

## 2021-06-04 DIAGNOSIS — E11.9 DIABETES MELLITUS, NEW ONSET (HCC): ICD-10-CM

## 2021-06-04 RX ORDER — BLOOD-GLUCOSE METER
KIT MISCELLANEOUS
Qty: 1 KIT | Refills: 0 | Status: SHIPPED | OUTPATIENT
Start: 2021-06-04

## 2021-06-07 ENCOUNTER — TELEPHONE (OUTPATIENT)
Dept: FAMILY MEDICINE CLINIC | Age: 56
End: 2021-06-07

## 2021-06-07 NOTE — TELEPHONE ENCOUNTER
Patient called stating he cant see right. Blurry, cant see distance, short distance vision is even worse. Seeing things that aren't there, and cant work. Started Saturday, and has gotten worse, has been taking DM medicine last 2 weeks, his Glucose 135 was this morning. Advised patient that I will forward message to Dr. Stefanie Hartmann, and will reach out to him with a response. Also advised patient to go to the ED.      Patient has upcoming appointment on 6/10/21

## 2021-06-10 ENCOUNTER — OFFICE VISIT (OUTPATIENT)
Dept: FAMILY MEDICINE CLINIC | Age: 56
End: 2021-06-10
Payer: COMMERCIAL

## 2021-06-10 VITALS
WEIGHT: 267.2 LBS | RESPIRATION RATE: 18 BRPM | OXYGEN SATURATION: 98 % | TEMPERATURE: 97.4 F | DIASTOLIC BLOOD PRESSURE: 80 MMHG | HEIGHT: 69 IN | BODY MASS INDEX: 39.58 KG/M2 | HEART RATE: 91 BPM | SYSTOLIC BLOOD PRESSURE: 126 MMHG

## 2021-06-10 DIAGNOSIS — L40.9 PSORIASIS: ICD-10-CM

## 2021-06-10 DIAGNOSIS — E03.9 HYPOTHYROIDISM, UNSPECIFIED TYPE: ICD-10-CM

## 2021-06-10 DIAGNOSIS — E11.9 DIABETES MELLITUS, NEW ONSET (HCC): ICD-10-CM

## 2021-06-10 DIAGNOSIS — E03.9 HYPOTHYROIDISM, UNSPECIFIED TYPE: Primary | ICD-10-CM

## 2021-06-10 LAB — TSH SERPL DL<=0.05 MIU/L-ACNC: 6.62 UIU/ML (ref 0.27–4.2)

## 2021-06-10 PROCEDURE — 99214 OFFICE O/P EST MOD 30 MIN: CPT | Performed by: FAMILY MEDICINE

## 2021-06-10 SDOH — ECONOMIC STABILITY: FOOD INSECURITY: WITHIN THE PAST 12 MONTHS, YOU WORRIED THAT YOUR FOOD WOULD RUN OUT BEFORE YOU GOT MONEY TO BUY MORE.: NEVER TRUE

## 2021-06-10 SDOH — ECONOMIC STABILITY: TRANSPORTATION INSECURITY
IN THE PAST 12 MONTHS, HAS LACK OF TRANSPORTATION KEPT YOU FROM MEETINGS, WORK, OR FROM GETTING THINGS NEEDED FOR DAILY LIVING?: NO

## 2021-06-10 SDOH — ECONOMIC STABILITY: TRANSPORTATION INSECURITY
IN THE PAST 12 MONTHS, HAS THE LACK OF TRANSPORTATION KEPT YOU FROM MEDICAL APPOINTMENTS OR FROM GETTING MEDICATIONS?: NO

## 2021-06-10 SDOH — ECONOMIC STABILITY: FOOD INSECURITY: WITHIN THE PAST 12 MONTHS, THE FOOD YOU BOUGHT JUST DIDN'T LAST AND YOU DIDN'T HAVE MONEY TO GET MORE.: NEVER TRUE

## 2021-06-10 ASSESSMENT — SOCIAL DETERMINANTS OF HEALTH (SDOH): HOW HARD IS IT FOR YOU TO PAY FOR THE VERY BASICS LIKE FOOD, HOUSING, MEDICAL CARE, AND HEATING?: NOT HARD AT ALL

## 2021-06-10 NOTE — PROGRESS NOTES
CC: Nissa Fisher is a 50 y.o. yo male is here for evaluation evaluation for the following acute & chronic medical concerns: Diabetes (scheduled with Dr. Rachana Jaimes 6/11/21) and Blurred Vision (called on 6/7/21 advised to go to hospital, went to eye doctor instead, and they are referred him to a specialist)      HPI:    Newly diagnosed DMII - on metformin 500mg BID  Blurry vision: did see optho and will be seeing a retinal therapist  Hypothyroidism with persistently elevated TSH - Adherent with 250mcg synthroid; he just started to take this on an empty stomach; he was taking this with all of this other medications prior    HTN - on prinzide  HLD - ASCVD 5.9%; not on medical therapy  Obesity - starting new diet this coming week  RYLEY- not on CPAP; Cannot wear mask  Depression- off of all medication; controlled  GERD - on prilosec  Psoriasis on leg - cream helped keep it stable; still present but never fully resolved  Low back pain f/u - ; severe pain after lifting box; xray with OA; still not approved for MRI through [facility]; hx of avascular necrosis of hips b/l -- did not discuss today  SOB after covid - prolonged; echo and stress completed and normal --- did not discuss today      Vitals:   /80   Pulse 91   Temp 97.4 °F (36.3 °C)   Resp 18   Ht 5' 8.5\" (1.74 m)   Wt 267 lb 3.2 oz (121.2 kg)   SpO2 98%   BMI 40.04 kg/m²   Wt Readings from Last 3 Encounters:   06/10/21 267 lb 3.2 oz (121.2 kg)   05/28/21 273 lb (123.8 kg)   02/05/21 281 lb (127.5 kg)       PE:  Constitutional - alert, well appearing, and in no distress  Eyes - extraocular eye movements intact, left eye normal, right eye normal, no conjunctivitis noted  Neck - symmetric, no obvious masses noted  Respiratory- clear to auscultation, no wheezes, rales or rhonchi, symmetric air entry; no increased work of breathing  Cardiovascular - normal rate, regular rhythm, normal S1, S2, no murmurs, rubs, clicks or gallops  Extremities - no

## 2021-07-15 ENCOUNTER — OFFICE VISIT (OUTPATIENT)
Dept: FAMILY MEDICINE CLINIC | Age: 56
End: 2021-07-15
Payer: COMMERCIAL

## 2021-07-15 VITALS
HEART RATE: 82 BPM | HEIGHT: 69 IN | RESPIRATION RATE: 18 BRPM | BODY MASS INDEX: 36.58 KG/M2 | OXYGEN SATURATION: 97 % | TEMPERATURE: 98.1 F | DIASTOLIC BLOOD PRESSURE: 78 MMHG | WEIGHT: 247 LBS | SYSTOLIC BLOOD PRESSURE: 122 MMHG

## 2021-07-15 DIAGNOSIS — E03.9 HYPOTHYROIDISM, UNSPECIFIED TYPE: ICD-10-CM

## 2021-07-15 DIAGNOSIS — Z23 NEED FOR VACCINATION: ICD-10-CM

## 2021-07-15 DIAGNOSIS — E11.9 DIABETES MELLITUS, NEW ONSET (HCC): Primary | ICD-10-CM

## 2021-07-15 LAB — TSH SERPL DL<=0.05 MIU/L-ACNC: 0.32 UIU/ML (ref 0.27–4.2)

## 2021-07-15 PROCEDURE — 90471 IMMUNIZATION ADMIN: CPT | Performed by: FAMILY MEDICINE

## 2021-07-15 PROCEDURE — 99214 OFFICE O/P EST MOD 30 MIN: CPT | Performed by: FAMILY MEDICINE

## 2021-07-15 PROCEDURE — 90472 IMMUNIZATION ADMIN EACH ADD: CPT | Performed by: FAMILY MEDICINE

## 2021-07-15 PROCEDURE — 90746 HEPB VACCINE 3 DOSE ADULT IM: CPT | Performed by: FAMILY MEDICINE

## 2021-07-15 PROCEDURE — 90732 PPSV23 VACC 2 YRS+ SUBQ/IM: CPT | Performed by: FAMILY MEDICINE

## 2021-07-15 NOTE — PROGRESS NOTES
CC: Jc Garvin is a 50 y.o. yo male is here for evaluation evaluation for the following acute & chronic medical concerns: Diabetes (1 mo follow up) and Hypothyroidism      HPI:    Newly diagnosed DMII - we recently increased the metformin to 1000mg BID  Blurry vision: did see optho and will be seeing a retinal therapist  Hypothyroidism with persistently elevated TSH - Adherent with 250mcg synthroid; he just started to take this on an empty stomach; last TSH much improved to 6; he lost 24lbs in the last 1.5 months    HTN - on prinzide  HLD - not on medical therapy  Obesity - starting new diet this coming week  RYLEY- not on CPAP; Cannot wear mask  Depression- off of all medication; controlled  GERD - on prilosec  Psoriasis on leg - cream helped keep it stable; still present but never fully resolved    Vitals:   /78   Pulse 82   Temp 98.1 °F (36.7 °C)   Resp 18   Ht 5' 8.5\" (1.74 m)   Wt 247 lb (112 kg)   SpO2 97%   BMI 37.01 kg/m²   Wt Readings from Last 3 Encounters:   07/15/21 247 lb (112 kg)   06/10/21 267 lb 3.2 oz (121.2 kg)   05/28/21 273 lb (123.8 kg)       PE:  Constitutional - alert, well appearing, and in no distress  Eyes - extraocular eye movements intact, left eye normal, right eye normal, no conjunctivitis noted  Neck - symmetric, no obvious masses noted  Respiratory- clear to auscultation, no wheezes, rales or rhonchi, symmetric air entry; no increased work of breathing  Cardiovascular - normal rate, regular rhythm, normal S1, S2, no murmurs, rubs, clicks or gallops  Extremities - no edema noted  Abdomen - soft, nontender, nondistended  Skin - normal coloration and turgor, no rashes, no suspicious skin lesions noted  Neurological - no obvious CN deficits or focal neurological deficits  Psychiatric - alert, oriented; normal mood, behavior, speech, dress    A / P:     Diagnosis Orders   1.  Diabetes mellitus, new onset ([address]  metFORMIN (GLUCOPHAGE) 1000 MG tablet    Lipid Panel CBC Auto Differential    Comprehensive Metabolic Panel    HEMOGLOBIN A1C    MICROALBUMIN / CREATININE URINE RATIO   2. Hypothyroidism, unspecified type  TSH without Reflex    TSH without Reflex   3. Need for vaccination  Pneumococcal polysaccharide vaccine 23-valent greater than or equal to 3yo subcutaneous/IM    Hep B Vaccine Adult (ENGERIX-B)     continue metformin to 1000mg BID  Will need to start statin at f/u visit  Diabetic education as able; did not complete due to work schedule  Continue to take the synthroid on empty stomach as discussed  I did give triamcinolone 0.5% in the past and I am not sure if he tried this. TSH today and labs before next visit    RTO: Return in about 1 month (around 8/15/2021) for DMII f/u.       An electronic signature was used to authenticate this note.  ---- Eladio Belcher MD on 7/15/2021 at 8:56 AM

## 2021-08-01 DIAGNOSIS — E03.9 HYPOTHYROIDISM, UNSPECIFIED TYPE: ICD-10-CM

## 2021-08-02 RX ORDER — LEVOTHYROXINE SODIUM 0.2 MG/1
TABLET ORAL
Qty: 90 TABLET | Refills: 1 | OUTPATIENT
Start: 2021-08-02

## 2021-08-06 DIAGNOSIS — E03.9 HYPOTHYROIDISM, UNSPECIFIED TYPE: ICD-10-CM

## 2021-08-06 RX ORDER — LEVOTHYROXINE SODIUM 0.05 MG/1
TABLET ORAL
Qty: 90 TABLET | Refills: 1 | OUTPATIENT
Start: 2021-08-06

## 2021-08-17 DIAGNOSIS — E03.9 HYPOTHYROIDISM, UNSPECIFIED TYPE: ICD-10-CM

## 2021-08-17 RX ORDER — LEVOTHYROXINE SODIUM 0.2 MG/1
200 TABLET ORAL DAILY
Qty: 90 TABLET | Refills: 0 | Status: SHIPPED
Start: 2021-08-17 | End: 2021-09-17 | Stop reason: SDUPTHER

## 2021-08-17 NOTE — TELEPHONE ENCOUNTER
Medication Refill Request    LOV 7/15/2021  NOV 8/26/2021    Lab Results   Component Value Date    CREATININE 0.9 05/28/2021

## 2021-08-25 DIAGNOSIS — R06.02 SHORTNESS OF BREATH: ICD-10-CM

## 2021-08-25 RX ORDER — ALBUTEROL SULFATE 90 UG/1
2 AEROSOL, METERED RESPIRATORY (INHALATION) 4 TIMES DAILY PRN
Qty: 1 INHALER | Refills: 3 | Status: SHIPPED
Start: 2021-08-25 | End: 2022-01-11 | Stop reason: SDUPTHER

## 2021-09-16 ENCOUNTER — HOSPITAL ENCOUNTER (OUTPATIENT)
Age: 56
Discharge: HOME | End: 2021-09-16
Payer: COMMERCIAL

## 2021-09-16 DIAGNOSIS — E11.9 DIABETES MELLITUS, NEW ONSET (HCC): ICD-10-CM

## 2021-09-16 DIAGNOSIS — E03.9 HYPOTHYROIDISM, UNSPECIFIED TYPE: ICD-10-CM

## 2021-09-16 LAB
ALBUMIN SERPL-MCNC: 4.4 G/DL (ref 3.5–5.2)
ALP BLD-CCNC: 61 U/L (ref 40–129)
ALT SERPL-CCNC: 118 U/L (ref 0–40)
ANION GAP SERPL CALCULATED.3IONS-SCNC: 11 MMOL/L (ref 7–16)
AST SERPL-CCNC: 101 U/L (ref 0–39)
BASOPHILS ABSOLUTE: 0.03 E9/L (ref 0–0.2)
BASOPHILS RELATIVE PERCENT: 0.4 % (ref 0–2)
BILIRUB SERPL-MCNC: 1.2 MG/DL (ref 0–1.2)
BUN BLDV-MCNC: 23 MG/DL (ref 6–20)
CALCIUM SERPL-MCNC: 10.8 MG/DL (ref 8.6–10.2)
CHLORIDE BLD-SCNC: 95 MMOL/L (ref 98–107)
CHOLESTEROL, TOTAL: 179 MG/DL (ref 0–199)
CO2: 28 MMOL/L (ref 22–29)
CREAT SERPL-MCNC: 1.2 MG/DL (ref 0.7–1.2)
CREATININE URINE: 216 MG/DL (ref 40–278)
EOSINOPHILS ABSOLUTE: 0.24 E9/L (ref 0.05–0.5)
EOSINOPHILS RELATIVE PERCENT: 3.5 % (ref 0–6)
GFR AFRICAN AMERICAN: >60
GFR NON-AFRICAN AMERICAN: >60 ML/MIN/1.73
GLUCOSE BLD-MCNC: 94 MG/DL (ref 74–99)
HBA1C MFR BLD: 5.6 % (ref 4–5.6)
HCT VFR BLD CALC: 44.8 % (ref 37–54)
HDLC SERPL-MCNC: 52 MG/DL
HEMOGLOBIN: 15.7 G/DL (ref 12.5–16.5)
IMMATURE GRANULOCYTES #: 0.02 E9/L
IMMATURE GRANULOCYTES %: 0.3 % (ref 0–5)
LDL CHOLESTEROL CALCULATED: 101 MG/DL (ref 0–99)
LYMPHOCYTES ABSOLUTE: 2.03 E9/L (ref 1.5–4)
LYMPHOCYTES RELATIVE PERCENT: 29.3 % (ref 20–42)
MCH RBC QN AUTO: 33.3 PG (ref 26–35)
MCHC RBC AUTO-ENTMCNC: 35 % (ref 32–34.5)
MCV RBC AUTO: 95.1 FL (ref 80–99.9)
MICROALBUMIN UR-MCNC: 18.3 MG/L
MICROALBUMIN/CREAT UR-RTO: 8.5 (ref 0–30)
MONOCYTES ABSOLUTE: 0.66 E9/L (ref 0.1–0.95)
MONOCYTES RELATIVE PERCENT: 9.5 % (ref 2–12)
NEUTROPHILS ABSOLUTE: 3.95 E9/L (ref 1.8–7.3)
NEUTROPHILS RELATIVE PERCENT: 57 % (ref 43–80)
PDW BLD-RTO: 12.4 FL (ref 11.5–15)
PLATELET # BLD: 203 E9/L (ref 130–450)
PMV BLD AUTO: 10 FL (ref 7–12)
POTASSIUM SERPL-SCNC: 4 MMOL/L (ref 3.5–5)
RBC # BLD: 4.71 E12/L (ref 3.8–5.8)
SODIUM BLD-SCNC: 134 MMOL/L (ref 132–146)
TOTAL PROTEIN: 8.7 G/DL (ref 6.4–8.3)
TRIGL SERPL-MCNC: 128 MG/DL (ref 0–149)
TSH SERPL DL<=0.05 MIU/L-ACNC: 0.09 UIU/ML (ref 0.27–4.2)
VLDLC SERPL CALC-MCNC: 26 MG/DL
WBC # BLD: 6.9 E9/L (ref 4.5–11.5)

## 2021-09-16 PROCEDURE — 84443 ASSAY THYROID STIM HORMONE: CPT

## 2021-09-16 PROCEDURE — 83036 HEMOGLOBIN GLYCOSYLATED A1C: CPT

## 2021-09-16 PROCEDURE — 36415 COLL VENOUS BLD VENIPUNCTURE: CPT

## 2021-09-16 PROCEDURE — 80061 LIPID PANEL: CPT

## 2021-09-16 PROCEDURE — 80053 COMPREHEN METABOLIC PANEL: CPT

## 2021-09-16 PROCEDURE — 82570 ASSAY OF URINE CREATININE: CPT

## 2021-09-16 PROCEDURE — 82044 UR ALBUMIN SEMIQUANTITATIVE: CPT

## 2021-09-16 PROCEDURE — 85025 COMPLETE CBC W/AUTO DIFF WBC: CPT

## 2021-09-17 DIAGNOSIS — E03.9 HYPOTHYROIDISM, UNSPECIFIED TYPE: ICD-10-CM

## 2021-09-17 DIAGNOSIS — R77.9 ELEVATED SERUM PROTEIN LEVEL: Primary | ICD-10-CM

## 2021-09-17 RX ORDER — LEVOTHYROXINE SODIUM 175 UG/1
175 TABLET ORAL DAILY
Qty: 90 TABLET | Refills: 0 | Status: SHIPPED
Start: 2021-09-17 | End: 2021-11-04 | Stop reason: SDUPTHER

## 2021-09-18 DIAGNOSIS — K21.9 GASTROESOPHAGEAL REFLUX DISEASE, UNSPECIFIED WHETHER ESOPHAGITIS PRESENT: ICD-10-CM

## 2021-09-18 DIAGNOSIS — I10 ESSENTIAL HYPERTENSION: ICD-10-CM

## 2021-09-20 RX ORDER — OMEPRAZOLE 20 MG/1
CAPSULE, DELAYED RELEASE ORAL
Qty: 30 CAPSULE | OUTPATIENT
Start: 2021-09-20

## 2021-09-20 RX ORDER — LISINOPRIL AND HYDROCHLOROTHIAZIDE 25; 20 MG/1; MG/1
TABLET ORAL
Qty: 30 TABLET | OUTPATIENT
Start: 2021-09-20

## 2021-09-27 ENCOUNTER — HOSPITAL ENCOUNTER (OUTPATIENT)
Age: 56
Discharge: HOME | End: 2021-09-27
Payer: COMMERCIAL

## 2021-09-27 DIAGNOSIS — R77.9 ELEVATED SERUM PROTEIN LEVEL: ICD-10-CM

## 2021-09-27 PROCEDURE — 36415 COLL VENOUS BLD VENIPUNCTURE: CPT

## 2021-09-27 PROCEDURE — 84165 PROTEIN E-PHORESIS SERUM: CPT

## 2021-09-27 PROCEDURE — 86334 IMMUNOFIX E-PHORESIS SERUM: CPT

## 2021-09-27 PROCEDURE — 82784 ASSAY IGA/IGD/IGG/IGM EACH: CPT

## 2021-09-27 PROCEDURE — 83883 ASSAY NEPHELOMETRY NOT SPEC: CPT

## 2021-09-28 LAB
KAPPA FREE LIGHT CHAINS QNT: 57.81 MG/L (ref 3.3–19.4)
KAPPA/LAMBDA FREE LIGHT CHAIN RATIO: 1.77 (ref 0.26–1.65)
LAMBDA FREE LIGHT CHAINS QNT: 32.68 MG/L (ref 5.71–26.3)

## 2021-09-29 ENCOUNTER — OFFICE VISIT (OUTPATIENT)
Dept: FAMILY MEDICINE CLINIC | Age: 56
End: 2021-09-29
Payer: COMMERCIAL

## 2021-09-29 VITALS
HEART RATE: 85 BPM | BODY MASS INDEX: 33.18 KG/M2 | RESPIRATION RATE: 20 BRPM | OXYGEN SATURATION: 96 % | WEIGHT: 224 LBS | SYSTOLIC BLOOD PRESSURE: 102 MMHG | DIASTOLIC BLOOD PRESSURE: 76 MMHG | HEIGHT: 69 IN | TEMPERATURE: 97.3 F

## 2021-09-29 DIAGNOSIS — E11.9 DIABETES MELLITUS, NEW ONSET (HCC): ICD-10-CM

## 2021-09-29 DIAGNOSIS — E03.9 HYPOTHYROIDISM, UNSPECIFIED TYPE: ICD-10-CM

## 2021-09-29 DIAGNOSIS — R77.9 ELEVATED SERUM PROTEIN LEVEL: Primary | ICD-10-CM

## 2021-09-29 DIAGNOSIS — K21.9 GASTROESOPHAGEAL REFLUX DISEASE, UNSPECIFIED WHETHER ESOPHAGITIS PRESENT: ICD-10-CM

## 2021-09-29 DIAGNOSIS — Z23 NEED FOR VACCINATION: ICD-10-CM

## 2021-09-29 DIAGNOSIS — I10 ESSENTIAL HYPERTENSION: ICD-10-CM

## 2021-09-29 LAB
ALBUMIN SERPL-MCNC: 3.4 G/DL (ref 3.5–4.7)
ALPHA-1-GLOBULIN: 0.2 G/DL (ref 0.2–0.4)
ALPHA-2-GLOBULIN: 0.9 G/FL (ref 0.5–1)
BETA GLOBULIN: 1.6 G/DL (ref 0.8–1.3)
ELECTROPHORESIS: ABNORMAL
GAMMA GLOBULIN: 1.3 G/DL (ref 0.7–1.6)
IGA: 886 MG/DL (ref 70–400)
IGG: 1500 MG/DL (ref 700–1600)
IGM: 75 MG/DL (ref 40–230)
IMMUNOFIXATION RESULT, SERUM: NORMAL
TOTAL PROTEIN: 7.5 G/DL (ref 6.4–8.3)

## 2021-09-29 PROCEDURE — 99214 OFFICE O/P EST MOD 30 MIN: CPT | Performed by: FAMILY MEDICINE

## 2021-09-29 PROCEDURE — 90746 HEPB VACCINE 3 DOSE ADULT IM: CPT | Performed by: FAMILY MEDICINE

## 2021-09-29 PROCEDURE — 90472 IMMUNIZATION ADMIN EACH ADD: CPT | Performed by: FAMILY MEDICINE

## 2021-09-29 PROCEDURE — 90471 IMMUNIZATION ADMIN: CPT | Performed by: FAMILY MEDICINE

## 2021-09-29 PROCEDURE — 90674 CCIIV4 VAC NO PRSV 0.5 ML IM: CPT | Performed by: FAMILY MEDICINE

## 2021-09-29 RX ORDER — LISINOPRIL AND HYDROCHLOROTHIAZIDE 20; 12.5 MG/1; MG/1
1 TABLET ORAL DAILY
Qty: 90 TABLET | Refills: 1 | Status: SHIPPED
Start: 2021-09-29 | End: 2022-04-15 | Stop reason: SDUPTHER

## 2021-09-29 RX ORDER — OMEPRAZOLE 20 MG/1
20 CAPSULE, DELAYED RELEASE ORAL DAILY
Qty: 90 CAPSULE | Refills: 1 | Status: SHIPPED
Start: 2021-09-29 | End: 2022-04-15 | Stop reason: SDUPTHER

## 2021-09-29 RX ORDER — LISINOPRIL AND HYDROCHLOROTHIAZIDE 25; 20 MG/1; MG/1
1 TABLET ORAL DAILY
Qty: 90 TABLET | Refills: 1 | Status: CANCELLED | OUTPATIENT
Start: 2021-09-29

## 2021-09-29 NOTE — PROGRESS NOTES
CC: Vanda Mendiola is a 49 y.o. yo male is here for evaluation evaluation for the following acute & chronic medical concerns: Diabetes and Discuss Labs      HPI:    Newly diagnosed DMII - on metformin to 1000mg BID  Blurry vision: did see optho and will be seeing a retinal therapist  HTN - on prinzide  HLD - not on medical therapy  Hypothyroidism: recent synthroid adjustment to 175mcg  Obesity - 223 8/19; then up to 277lb 1/21 and down now to 224;  working on diet and exercise; recently started metformin and recent improved control of his thyroid  RYLEY- not on CPAP; Cannot wear mask  Depression- off of all medication; controlled  GERD - on prilosec  ? Psoriasis - has f/u scheduled with derm  Elevated protein - f/u labs pending but free light chain elevation; no bone pains but does have L elbow pain    Vitals:   /76   Pulse 85   Temp 97.3 °F (36.3 °C)   Resp 20   Ht 5' 8.5\" (1.74 m)   Wt 224 lb (101.6 kg)   SpO2 96%   BMI 33.56 kg/m²   Wt Readings from Last 3 Encounters:   09/29/21 224 lb (101.6 kg)   07/15/21 247 lb (112 kg)   06/10/21 267 lb 3.2 oz (121.2 kg)       PE:  Constitutional - alert, well appearing, and in no distress  Eyes - extraocular eye movements intact, left eye normal, right eye normal, no conjunctivitis noted  Neck - symmetric, no obvious masses noted  Respiratory- clear to auscultation, no wheezes, rales or rhonchi, symmetric air entry; no increased work of breathing  Cardiovascular - normal rate, regular rhythm, normal S1, S2, no murmurs, rubs, clicks or gallops  Extremities - no edema noted  Abdomen - soft, nontender, nondistended  Skin - normal coloration and turgor, no rashes, no suspicious skin lesions noted  Neurological - no obvious CN deficits or focal neurological deficits  Psychiatric - alert, oriented; normal mood, behavior, speech, dress    A / P:     Diagnosis Orders   1. Elevated serum protein level  Stanley Porter MD, Medical Oncology, [facility]   2. Gastroesophageal reflux disease, unspecified whether esophagitis present  omeprazole (PRILOSEC) 20 MG delayed release capsule   3. Essential hypertension  lisinopril-hydroCHLOROthiazide (PRINZIDE;ZESTORETIC) 20-12.5 MG per tablet   4. Diabetes mellitus, new onset ([facility] [address]  metFORMIN (GLUCOPHAGE) 1000 MG tablet   5. Hypothyroidism, unspecified type  TSH without Reflex   6. 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 Mariama Cole  Pending results SPEP and Ig studies  Decrease HCTZ to 12.5mg from 25mg  continue metformin to 1000mg BID  Consider statin at f/u visit  Continue synthroid 175mcg and recheck TSH in about 2-3 weeks  HM as ordered    RTO: Return in about 1 month (around 10/29/2021) for routine f/u.       An electronic signature was used to authenticate this note.  ---- Luis Miguel Hollins MD on 9/29/2021 at 3:20 PM

## 2021-10-07 ENCOUNTER — HOSPITAL ENCOUNTER (OUTPATIENT)
Dept: INFUSION THERAPY | Age: 56
Discharge: HOME | End: 2021-10-07
Payer: COMMERCIAL

## 2021-10-07 ENCOUNTER — OFFICE VISIT (OUTPATIENT)
Dept: ONCOLOGY | Age: 56
End: 2021-10-07
Payer: COMMERCIAL

## 2021-10-07 VITALS
DIASTOLIC BLOOD PRESSURE: 70 MMHG | RESPIRATION RATE: 16 BRPM | BODY MASS INDEX: 32.97 KG/M2 | OXYGEN SATURATION: 98 % | HEIGHT: 69 IN | WEIGHT: 222.6 LBS | SYSTOLIC BLOOD PRESSURE: 111 MMHG | TEMPERATURE: 97.2 F | HEART RATE: 64 BPM

## 2021-10-07 DIAGNOSIS — R77.8 ELEVATED TOTAL PROTEIN: ICD-10-CM

## 2021-10-07 DIAGNOSIS — R77.8 ELEVATED TOTAL PROTEIN: Primary | ICD-10-CM

## 2021-10-07 LAB
ALBUMIN SERPL-MCNC: 4.2 G/DL (ref 3.5–5.2)
ALP BLD-CCNC: 50 U/L (ref 40–129)
ALT SERPL-CCNC: 69 U/L (ref 0–40)
ANION GAP SERPL CALCULATED.3IONS-SCNC: 15 MMOL/L (ref 7–16)
AST SERPL-CCNC: 56 U/L (ref 0–39)
BASOPHILS ABSOLUTE: 0.04 E9/L (ref 0–0.2)
BASOPHILS RELATIVE PERCENT: 0.6 % (ref 0–2)
BILIRUB SERPL-MCNC: 1.1 MG/DL (ref 0–1.2)
BUN BLDV-MCNC: 34 MG/DL (ref 6–20)
CALCIUM SERPL-MCNC: 10.6 MG/DL (ref 8.6–10.2)
CHLORIDE BLD-SCNC: 101 MMOL/L (ref 98–107)
CO2: 21 MMOL/L (ref 22–29)
CREAT SERPL-MCNC: 1.4 MG/DL (ref 0.7–1.2)
EOSINOPHILS ABSOLUTE: 0.43 E9/L (ref 0.05–0.5)
EOSINOPHILS RELATIVE PERCENT: 5.9 % (ref 0–6)
GFR AFRICAN AMERICAN: >60
GFR NON-AFRICAN AMERICAN: 53 ML/MIN/1.73
GLUCOSE BLD-MCNC: 85 MG/DL (ref 74–99)
HCT VFR BLD CALC: 41 % (ref 37–54)
HEMOGLOBIN: 14.3 G/DL (ref 12.5–16.5)
IMMATURE GRANULOCYTES #: 0.01 E9/L
IMMATURE GRANULOCYTES %: 0.1 % (ref 0–5)
LACTATE DEHYDROGENASE: 149 U/L (ref 135–225)
LYMPHOCYTES ABSOLUTE: 2.6 E9/L (ref 1.5–4)
LYMPHOCYTES RELATIVE PERCENT: 35.8 % (ref 20–42)
MCH RBC QN AUTO: 32.5 PG (ref 26–35)
MCHC RBC AUTO-ENTMCNC: 34.9 % (ref 32–34.5)
MCV RBC AUTO: 93.2 FL (ref 80–99.9)
MONOCYTES ABSOLUTE: 0.82 E9/L (ref 0.1–0.95)
MONOCYTES RELATIVE PERCENT: 11.3 % (ref 2–12)
NEUTROPHILS ABSOLUTE: 3.36 E9/L (ref 1.8–7.3)
NEUTROPHILS RELATIVE PERCENT: 46.3 % (ref 43–80)
PDW BLD-RTO: 12.8 FL (ref 11.5–15)
PLATELET # BLD: 271 E9/L (ref 130–450)
PMV BLD AUTO: 10.5 FL (ref 7–12)
POTASSIUM SERPL-SCNC: 4 MMOL/L (ref 3.5–5)
RBC # BLD: 4.4 E12/L (ref 3.8–5.8)
SEDIMENTATION RATE, ERYTHROCYTE: 43 MM/HR (ref 0–15)
SODIUM BLD-SCNC: 137 MMOL/L (ref 132–146)
URIC ACID, SERUM: 4.8 MG/DL (ref 3.4–7)
WBC # BLD: 7.3 E9/L (ref 4.5–11.5)

## 2021-10-07 PROCEDURE — 99205 OFFICE O/P NEW HI 60 MIN: CPT | Performed by: INTERNAL MEDICINE

## 2021-10-07 PROCEDURE — 99214 OFFICE O/P EST MOD 30 MIN: CPT

## 2021-10-07 PROCEDURE — 36415 COLL VENOUS BLD VENIPUNCTURE: CPT

## 2021-10-07 NOTE — PROGRESS NOTES
Department of [facility] Oncology  Attending Consult Note    Reason for Visit:  Consultation on a patient with elevated Total Protein    Referring Physician: Deborah Lan MD    PCP:  Deborah Lan MD    History of Present Illness:  48 y/o male with hx of HTN, hypothyroidism, GERD who was noted to have elevated total protein  On 09/16/2021: Hb 15.7 Hct 44.8  MCV 95.1  WBC 6.9    BUN 23 Creat 1.2  Ca 10.8  Albumin 4.4 Total Protein 8.7  SPEP: Beta globulin 1.6 (0.8-1.3 g/dl). The beta fraction is increased. Complement, beta-lipoprotein (LDL-cholesterol), and transferrin are the major beta   globulins. This pattern may occur in acute phase reactions, iron deficiency anemia, high estrogen states (pregnancy). hypercholesterolemia, or occasionally may represent a para-protein  SIFE: Normal. No monoclonal protein identified  IgA 886    ()  IgG 1500 (700-1600)   IgM 75     ()  Kappa FLC    57.81 (3.30-19.40)  Lambda FLC  32.68 (5.71-26.30)  Kappa/Lambda ratio 1.77 (0.26-1.65)    Review of Systems;  CONSTITUTIONAL: No fever, chills. Good appetite and energy level. ENMT: Eyes: No diplopia; Nose: No epistaxis. Mouth: No sore throat. RESPIRATORY: No hemoptysis, shortness of breath, cough. CARDIOVASCULAR: No chest pain, palpitations. GASTROINTESTINAL: No nausea/vomiting, abdominal pain, diarrhea/constipation. GENITOURINARY: No dysuria, urinary frequency, hematuria. NEURO: No syncope, presyncope, headache.   Remainder:  ROS NEGATIVE    Past Medical History:      Diagnosis Date    GERD (gastroesophageal reflux disease)     Hypertension     Hypothyroidism     Nephrolithiasis     Sleep apnea     Thyroid disease      Past Surgical History:      Procedure Laterality Date    LITHOTRIPSY       Family History:  Family History   Problem Relation Age of Onset    Cancer Mother 77        colon    High Blood Pressure Mother     Cancer Father 77        liver    High Blood Pressure Father     Physical Exam:  /70 (Site: Right Upper Arm)   Pulse 64   Temp 97.2 °F (36.2 °C) (Infrared)   Resp 16   Ht 5' 8.5\" (1.74 m)   Wt 222 lb 9.6 oz (101 kg)   SpO2 98%   BMI 33.35 kg/m²   GENERAL: Alert, oriented x 3, not in acute distress. HEENT: PERRLA; EOMI. Oropharynx clear. NECK: Supple. Without lymphadenopathy. LUNGS: Good air entry bilaterally. No wheezing, crackles or ronchi. CARDIOVASCULAR: Regular rate. No murmurs, rubs or gallops. ABDOMEN: Soft. Non-tender, non-distended. Positive bowel sounds. EXTREMITIES: Without clubbing, cyanosis, or edema. NEUROLOGIC: No focal deficits. Impression/Plan:  50 y/o male with hx of HTN, hypothyroidism, GERD who was noted to have elevated total protein  On 09/16/2021: Hb 15.7 Hct 44.8  MCV 95.1  WBC 6.9    BUN 23 Creat 1.2  Ca 10.8  Albumin 4.4 Total Protein 8.7  SPEP: Beta globulin 1.6 (0.8-1.3 g/dl). The beta fraction is increased. Complement, beta-lipoprotein (LDL-cholesterol), and transferrin are the major beta   globulins. This pattern may occur in acute phase reactions, iron deficiency anemia, high estrogen states (pregnancy). hypercholesterolemia, or occasionally may represent a para-protein  SIFE: Normal. No monoclonal protein identified  IgA 886    ()  IgG 1500 (700-1600)   IgM 75     ()  Kappa FLC    57.81 (3.30-19.40)  Lambda FLC  32.68 (5.71-26.30)  Kappa/Lambda ratio 1.77 (0.26-1.65)  Extensive blood work ordered and drawn today 10/07/2021  24hr UPEP UIFE ordered  Skeletal Survey ordered  RTC 3 weeks to review test results. Thank you for allowing us to participate in the care of   Bin Epperson MD   10/7/2021

## 2021-10-07 NOTE — PROGRESS NOTES
Adam Matthieu  1965 49 y.o. Referring Physician: Dr Fred Herron    PCP: Lesa Mendoza MD    Vitals:    10/07/21 1513   BP: 111/70   Pulse: 64   Resp: 16   Temp: 97.2 °F (36.2 °C)   SpO2: 98%        Wt Readings from Last 3 Encounters:   10/07/21 222 lb 9.6 oz (101 kg)   09/29/21 224 lb (101.6 kg)   07/15/21 247 lb (112 kg)        Body mass index is 33.35 kg/m². Chief Complaint: No chief complaint on file. Current Outpatient Medications:     omeprazole (PRILOSEC) 20 MG delayed release capsule, Take 1 capsule by mouth Daily take 1 capsule by mouth once daily, Disp: 90 capsule, Rfl: 1    lisinopril-hydroCHLOROthiazide (PRINZIDE;ZESTORETIC) 20-12.5 MG per tablet, Take 1 tablet by mouth daily, Disp: 90 tablet, Rfl: 1    metFORMIN (GLUCOPHAGE) 1000 MG tablet, Take 1 tablet by mouth 2 times daily (with meals), Disp: 180 tablet, Rfl: 3    levothyroxine (SYNTHROID) 175 MCG tablet, Take 1 tablet by mouth Daily take 1 tablet by mouth once daily, Disp: 90 tablet, Rfl: 0    albuterol sulfate HFA (VENTOLIN HFA) 108 (90 Base) MCG/ACT inhaler, Inhale 2 puffs into the lungs 4 times daily as needed for Wheezing, Disp: 1 Inhaler, Rfl: 3    hydrocortisone 2.5 % cream, Apply topically 2 times daily.  (Patient not taking: Reported on 9/29/2021), Disp: 45 g, Rfl: 0    Blood Glucose Monitoring Suppl (ONE TOUCH ULTRA MINI) w/Device KIT, Check blood sugar bid, Disp: 1 kit, Rfl: 0    ibuprofen (IBU) 600 MG tablet, Take 1 tablet by mouth every 6 hours as needed for Pain (Patient not taking: Reported on 9/29/2021), Disp: 40 tablet, Rfl: 0    Handicap Placard MISC, by Does not apply route, Disp: 1 each, Rfl: 0       Past Medical History:   Diagnosis Date    GERD (gastroesophageal reflux disease)     Hypertension     Hypothyroidism     Nephrolithiasis     Sleep apnea     Thyroid disease        Past Surgical History:   Procedure Laterality Date    COLONOSCOPY      few years ago alen Fletcher REPLACEMENT      bilat hips    KIDNEY STONE REMOVAL      LITHOTRIPSY      UPPER GASTROINTESTINAL ENDOSCOPY      years ago       Family History   Problem Relation Age of Onset   Ermelinda Pineda Cancer Mother 77        colon    High Blood Pressure Mother     Cancer Father 77        liver    High Blood Pressure Father     Cancer Paternal Aunt     Cancer Paternal Uncle        Social History     Socioeconomic History    Marital status:      Spouse name: Not on file    Number of children: 3    Years of education: Not on file    Highest education level: Not on file   Occupational History    Occupation:  at [employer]    Smoking status: Former Smoker     Packs/day: 0.25     Years: 30.00     Pack years: 7.50     Types: Cigarettes     Quit date: 2/14/2018     Years since quitting: 3.6    Smokeless tobacco: Never Used   Vaping Use    Vaping Use: Never used   Substance and Sexual Activity    Alcohol use: Not Currently     Alcohol/week: 0.0 standard drinks     Comment: socially    Drug use: No    Sexual activity: Yes     Partners: Female   Other Topics Concern    Not on file   Social History Narrative    Not on file     Social Determinants of Health     Financial Resource Strain: Low Risk     Difficulty of Paying Living Expenses: Not hard at all   Food Insecurity: No Food Insecurity    Worried About Running Out of Food in the Last Year: Never true    Genevieve of Food in the Last Year: Never true   Transportation Needs: No Transportation Needs    Lack of Transportation (Medical): No    Lack of Transportation (Non-Medical):  No   Physical Activity:     Days of Exercise per Week:     Minutes of Exercise per Session:    Stress:     Feeling of Stress :    Social Connections:     Frequency of Communication with Friends and Family:     Frequency of Social Gatherings with Friends and Family:     Attends [employer] Services:     Active Member of Clubs or Organizations:     confused/cognitively impaired patient  3. Call-light/bell within patient's reach  4. Chair/bed in low position, stretcher/bed with siderails up except when performing patient care activities  5. Educate patient/family/caregiver on falls prevention  6. Falls risk precaution (Yellow sticker Level II) placed on patient chart   7 or   Higher High Risk 1. Place patient in easily observable treatment room  2. Patient attended at all times by family member or staff  3. Provide assistance as indicated for ambulation activities  4. Reorient confused/cognitively impaired patient  5. Call-light/bell within patient's reach  6. Chair/bed in low position, stretcher/bed with siderails up except when performing patient care activities  7. Educate patient/family/caregiver on falls prevention  8.   Falls risk precaution (Yellow sticker Level III) placed on patient chart                     Aaliyah Gil RN

## 2021-10-08 LAB
ALBUMIN SERPL-MCNC: 3.3 G/DL (ref 3.5–4.7)
ALPHA-1-GLOBULIN: 0.2 G/DL (ref 0.2–0.4)
ALPHA-2-GLOBULIN: 0.9 G/DL (ref 0.5–1)
BETA GLOBULIN: 1.6 G/DL (ref 0.8–1.3)
C-REACTIVE PROTEIN: 0.7 MG/DL (ref 0–0.4)
ELECTROPHORESIS: ABNORMAL
GAMMA GLOBULIN: 1.5 G/DL (ref 0.7–1.6)
HAV IGM SER IA-ACNC: NORMAL
HEPATITIS B CORE IGM ANTIBODY: NORMAL
HEPATITIS B SURFACE ANTIGEN INTERPRETATION: NORMAL
HEPATITIS C ANTIBODY INTERPRETATION: NORMAL
HIV-1 AND HIV-2 ANTIBODIES: NORMAL
IGA: 773 MG/DL (ref 70–400)
IGG: 1470 MG/DL (ref 700–1600)
IGM: 75 MG/DL (ref 40–230)
IMMUNOFIXATION RESULT, SERUM: NORMAL
PATHOLOGIST REVIEW: NORMAL
TOTAL PROTEIN: 8.5 G/DL (ref 6.4–8.3)

## 2021-10-09 LAB
KAPPA FREE LIGHT CHAINS QNT: 55.58 MG/L (ref 3.3–19.4)
KAPPA/LAMBDA FREE LIGHT CHAIN RATIO: 1.67 (ref 0.26–1.65)
LAMBDA FREE LIGHT CHAINS QNT: 33.37 MG/L (ref 5.71–26.3)

## 2021-10-11 LAB — BETA-2 MICROGLOBULIN: 3.2 MG/L (ref 0.6–2.4)

## 2021-10-12 ENCOUNTER — HOSPITAL ENCOUNTER (OUTPATIENT)
Age: 56
Discharge: HOME | End: 2021-10-12
Payer: COMMERCIAL

## 2021-10-12 PROCEDURE — 84166 PROTEIN E-PHORESIS/URINE/CSF: CPT

## 2021-10-15 LAB — ADDENDUM ELECTROPHORESIS URINE RANDOM: NORMAL

## 2021-10-23 ENCOUNTER — HOSPITAL ENCOUNTER (OUTPATIENT)
Dept: GENERAL RADIOLOGY | Age: 56
Discharge: HOME | End: 2021-10-25
Payer: COMMERCIAL

## 2021-10-23 PROCEDURE — 77075 RADEX OSSEOUS SURVEY COMPL: CPT

## 2021-10-28 ENCOUNTER — HOSPITAL ENCOUNTER (OUTPATIENT)
Dept: INFUSION THERAPY | Age: 56
Discharge: HOME | End: 2021-10-28
Payer: COMMERCIAL

## 2021-10-28 ENCOUNTER — OFFICE VISIT (OUTPATIENT)
Dept: ONCOLOGY | Age: 56
End: 2021-10-28
Payer: COMMERCIAL

## 2021-10-28 VITALS
DIASTOLIC BLOOD PRESSURE: 65 MMHG | HEART RATE: 71 BPM | HEIGHT: 69 IN | OXYGEN SATURATION: 94 % | TEMPERATURE: 97.3 F | BODY MASS INDEX: 32.69 KG/M2 | WEIGHT: 220.7 LBS | SYSTOLIC BLOOD PRESSURE: 99 MMHG

## 2021-10-28 DIAGNOSIS — R77.8 ELEVATED TOTAL PROTEIN: Primary | ICD-10-CM

## 2021-10-28 PROCEDURE — 99212 OFFICE O/P EST SF 10 MIN: CPT

## 2021-10-28 PROCEDURE — 99214 OFFICE O/P EST MOD 30 MIN: CPT | Performed by: INTERNAL MEDICINE

## 2021-10-28 NOTE — PROGRESS NOTES
Department of [facility] Oncology  Attending Clinic Note    Reason for Visit: Follow-up on a patient with elevated Total Protein    PCP:  Libby Winter MD    History of Present Illness:  48 y/o male with hx of HTN, hypothyroidism, GERD who was noted to have elevated total protein  On 09/16/2021: Hb 15.7 Hct 44.8  MCV 95.1  WBC 6.9    BUN 23 Creat 1.2  Ca 10.8  Albumin 4.4 Total Protein 8.7  SPEP: Beta globulin 1.6 (0.8-1.3 g/dl). The beta fraction is increased. Complement, beta-lipoprotein (LDL-cholesterol), and transferrin are the major beta   globulins. This pattern may occur in acute phase reactions, iron deficiency anemia, high estrogen states (pregnancy). hypercholesterolemia, or occasionally may represent a para-protein  SIFE: Normal. No monoclonal protein identified  IgA 886    ()  IgG 1500 (700-1600)   IgM 75     ()  Kappa FLC    57.81 (3.30-19.40)  Lambda FLC  32.68 (5.71-26.30)  Kappa/Lambda ratio 1.77 (0.26-1.65)    Review of Systems;  CONSTITUTIONAL: No fever, chills. Good appetite and energy level. ENMT: Eyes: No diplopia; Nose: No epistaxis. Mouth: No sore throat. RESPIRATORY: No hemoptysis, shortness of breath, cough. CARDIOVASCULAR: No chest pain, palpitations. GASTROINTESTINAL: No nausea/vomiting, abdominal pain, diarrhea/constipation. GENITOURINARY: No dysuria, urinary frequency, hematuria. NEURO: No syncope, presyncope, headache. Remainder:  ROS NEGATIVE    Past Medical History:      Diagnosis Date    GERD (gastroesophageal reflux disease)     Hypertension     Hypothyroidism     Nephrolithiasis     Sleep apnea     Thyroid disease      Medications:  Reviewed and reconciled. Allergies:   Allergies   Allergen Reactions    Sulfa Antibiotics Rash     Physical Exam:  BP 99/65   Pulse 71   Temp 97.3 °F (36.3 °C)   Ht 5' 8.5\" (1.74 m)   Wt 220 lb 11.2 oz (100.1 kg)   SpO2 94%   BMI 33.07 kg/m²   GENERAL: Alert, oriented x 3, not in acute present. M-Juliano is not observed    Skeletal Survey 10/23/2021 noted no lytic lesions. Imaging reviewed. Labs reviewed. No evidence of plasma cell neoplasm. Patient was re-assured. Continue to follow with PCP.   RTC PRN    Wei Griffin MD   10/28/2021

## 2021-10-29 ENCOUNTER — HOSPITAL ENCOUNTER (OUTPATIENT)
Age: 56
Discharge: HOME | End: 2021-10-29
Payer: COMMERCIAL

## 2021-10-29 DIAGNOSIS — E03.9 HYPOTHYROIDISM, UNSPECIFIED TYPE: ICD-10-CM

## 2021-10-29 LAB — TSH SERPL DL<=0.05 MIU/L-ACNC: 0.16 UIU/ML (ref 0.27–4.2)

## 2021-10-29 PROCEDURE — 84443 ASSAY THYROID STIM HORMONE: CPT

## 2021-10-29 PROCEDURE — 36415 COLL VENOUS BLD VENIPUNCTURE: CPT

## 2021-11-04 ENCOUNTER — OFFICE VISIT (OUTPATIENT)
Dept: FAMILY MEDICINE CLINIC | Age: 56
End: 2021-11-04
Payer: COMMERCIAL

## 2021-11-04 VITALS
HEIGHT: 69 IN | OXYGEN SATURATION: 97 % | DIASTOLIC BLOOD PRESSURE: 90 MMHG | HEART RATE: 86 BPM | TEMPERATURE: 98 F | RESPIRATION RATE: 18 BRPM | SYSTOLIC BLOOD PRESSURE: 130 MMHG | BODY MASS INDEX: 32.58 KG/M2 | WEIGHT: 220 LBS

## 2021-11-04 DIAGNOSIS — K21.9 GASTROESOPHAGEAL REFLUX DISEASE, UNSPECIFIED WHETHER ESOPHAGITIS PRESENT: ICD-10-CM

## 2021-11-04 DIAGNOSIS — I10 PRIMARY HYPERTENSION: Primary | ICD-10-CM

## 2021-11-04 DIAGNOSIS — E03.9 HYPOTHYROIDISM, UNSPECIFIED TYPE: ICD-10-CM

## 2021-11-04 DIAGNOSIS — E55.9 VITAMIN D DEFICIENCY, UNSPECIFIED: ICD-10-CM

## 2021-11-04 DIAGNOSIS — E78.5 HYPERLIPIDEMIA, UNSPECIFIED HYPERLIPIDEMIA TYPE: ICD-10-CM

## 2021-11-04 DIAGNOSIS — E11.9 TYPE 2 DIABETES MELLITUS WITHOUT COMPLICATION, WITHOUT LONG-TERM CURRENT USE OF INSULIN (HCC): ICD-10-CM

## 2021-11-04 DIAGNOSIS — F32.A DEPRESSION, UNSPECIFIED DEPRESSION TYPE: ICD-10-CM

## 2021-11-04 PROCEDURE — 99214 OFFICE O/P EST MOD 30 MIN: CPT | Performed by: FAMILY MEDICINE

## 2021-11-04 RX ORDER — LEVOTHYROXINE SODIUM 0.15 MG/1
150 TABLET ORAL DAILY
Qty: 90 TABLET | Refills: 1 | Status: SHIPPED
Start: 2021-11-04 | End: 2022-04-15

## 2021-11-04 NOTE — PROGRESS NOTES
CC: Deni Parada is a 49 y.o. yo male is here for evaluation evaluation for the following acute & chronic medical concerns: Hypertension (1 mo follow up), Hypothyroidism, and Depression      HPI:      DMII - on metformin to 1000mg BID; last A1C 5.4  Blurry vision: follows with retinal therapist  HTN - on prinzide  HLD - not on medical therapy  Hypothyroidism: recent synthroid adjustment to 175mcg  Obesity - 223 8/19; then up to 277lb 1/21 and down now to 220;  working on diet and exercise; recently started metformin and recent improved control of his thyroid  RYLEY- not on CPAP; Cannot wear mask  Depression- off of all medication; controlled  GERD - on prilosec  Psoriasis - follows with derm  Elevated protein - did see hematology with no evidence of plasma cell neoplasm. Elevated liver enzymes    Vitals:   BP (!) 130/90   Pulse 86   Temp 98 °F (36.7 °C)   Resp 18   Ht 5' 8.5\" (1.74 m)   Wt 220 lb (99.8 kg)   SpO2 97%   BMI 32.96 kg/m²   Wt Readings from Last 3 Encounters:   11/04/21 220 lb (99.8 kg)   10/28/21 220 lb 11.2 oz (100.1 kg)   10/07/21 222 lb 9.6 oz (101 kg)       PE:  Constitutional - alert, well appearing, and in no distress  Eyes - extraocular eye movements intact, left eye normal, right eye normal, no conjunctivitis noted  Neck - symmetric, no obvious masses noted  Respiratory- clear to auscultation, no wheezes, rales or rhonchi, symmetric air entry; no increased work of breathing  Cardiovascular - normal rate, regular rhythm, normal S1, S2, no murmurs, rubs, clicks or gallops  Extremities - no edema noted  Abdomen - soft, nontender, nondistended  Skin - normal coloration and turgor, no rashes, no suspicious skin lesions noted      A / P:     Diagnosis Orders   1. Primary hypertension  CBC Auto Differential    Comprehensive Metabolic Panel   2. Hyperlipidemia, unspecified hyperlipidemia type  Lipid Panel   3.  Type 2 diabetes mellitus without complication, without long-term current use of insulin (HCC)  CBC Auto Differential    Comprehensive Metabolic Panel    Lipid Panel    Hemoglobin A1C   4. Hypothyroidism, unspecified type  TSH without Reflex    levothyroxine (SYNTHROID) 150 MCG tablet    TSH without Reflex   5. Depression, unspecified depression type     6. Gastroesophageal reflux disease, unspecified whether esophagitis present     7. Vitamin D deficiency, unspecified  Vitamin D 25 Hydroxy       Decrease synthroid to 150mcg and recheck TSH in about 4-6 weeks  Continue prinzide  continue metformin to 1000mg BID  Consider statin at f/u visit  HM as ordered  Labs as ordered  Monitor LFT's; consider referral to GI as needed    RTO: Return in about 3 months (around 2/4/2022) for routine f/u.       An electronic signature was used to authenticate this note.  ---- Elvi Alcazar MD on 11/4/2021 at 4:53 PM

## 2021-12-15 DIAGNOSIS — M54.50 ACUTE MIDLINE LOW BACK PAIN, UNSPECIFIED WHETHER SCIATICA PRESENT: ICD-10-CM

## 2021-12-15 RX ORDER — IBUPROFEN 800 MG/1
TABLET ORAL
Qty: 90 TABLET | Refills: 11 | OUTPATIENT
Start: 2021-12-15

## 2022-01-10 ENCOUNTER — TELEPHONE (OUTPATIENT)
Dept: FAMILY MEDICINE CLINIC | Age: 57
End: 2022-01-10

## 2022-01-10 NOTE — TELEPHONE ENCOUNTER
Pt called stating he had a rapid covid test done at work and tested positive. Pt stated he has a sinus infection and a cough, wants to know what he could do to help with symptoms. Please advise.

## 2022-01-10 NOTE — TELEPHONE ENCOUNTER
We do not routinely order any treatment medications. You can take vitamin C, vitamin D and zinc OTC and use routine OTC supportive measures to treat symptoms.   If your symptoms worsen or fail to improve, you will need to be seen in person (urgent care or ED)      If wants can offer virtual apt with me tomorrow 7:45am.

## 2022-01-11 ENCOUNTER — VIRTUAL VISIT (OUTPATIENT)
Dept: FAMILY MEDICINE CLINIC | Age: 57
End: 2022-01-11
Payer: COMMERCIAL

## 2022-01-11 DIAGNOSIS — R06.02 SHORTNESS OF BREATH: ICD-10-CM

## 2022-01-11 DIAGNOSIS — U07.1 COVID-19: Primary | ICD-10-CM

## 2022-01-11 PROCEDURE — 99213 OFFICE O/P EST LOW 20 MIN: CPT | Performed by: FAMILY MEDICINE

## 2022-01-11 RX ORDER — PREDNISONE 20 MG/1
40 TABLET ORAL DAILY
Qty: 10 TABLET | Refills: 0 | Status: SHIPPED | OUTPATIENT
Start: 2022-01-11 | End: 2022-01-16

## 2022-01-11 RX ORDER — AZITHROMYCIN 250 MG/1
TABLET, FILM COATED ORAL
Qty: 6 TABLET | Refills: 0 | Status: SHIPPED
Start: 2022-01-11 | End: 2022-04-15

## 2022-01-11 RX ORDER — IBUPROFEN 600 MG/1
600 TABLET ORAL EVERY 6 HOURS PRN
Qty: 40 TABLET | Refills: 0 | Status: SHIPPED
Start: 2022-01-11 | End: 2022-08-04

## 2022-01-11 RX ORDER — BROMPHENIRAMINE MALEATE, PSEUDOEPHEDRINE HYDROCHLORIDE, AND DEXTROMETHORPHAN HYDROBROMIDE 2; 30; 10 MG/5ML; MG/5ML; MG/5ML
5 SYRUP ORAL 4 TIMES DAILY PRN
Qty: 240 ML | Refills: 1 | Status: SHIPPED | OUTPATIENT
Start: 2022-01-11 | End: 2022-02-10

## 2022-01-11 RX ORDER — ALBUTEROL SULFATE 90 UG/1
2 AEROSOL, METERED RESPIRATORY (INHALATION) 4 TIMES DAILY PRN
Qty: 1 EACH | Refills: 0 | Status: SHIPPED | OUTPATIENT
Start: 2022-01-11

## 2022-01-11 NOTE — PROGRESS NOTES
TELEHEALTH EVALUATION -- Audio/Visual (During SSZVP-67 public health emergency)    CC: Nain Lam is a 49 y.o. yo male has requested a/an video evaluation for the following  acute medical concerns: Sinusitis (headaches), Cough, Pharyngitis, Chills, Generalized Body Aches, and Diarrhea      HPI:    COVID infection  Symptoms ongoing 1-2 days  Sinusitis (headaches), Cough, Pharyngitis, Chills, Generalized Body Aches, and Diarrhea  +cough; no sob; slight improvement with albuterol  Fully vaccinated      Vitals / PE:  Due to this being a TeleHealth encounter (During PVO-64 public health emergency), evaluation of the following organ systems was limited: Vitals/Constitutional/EENT/Resp/CV/GI//MS/Neuro/Skin/Heme-Lymph-Imm. Vital Signs: (As obtained by patient/caregiver or practitioner observation)  Blood pressure-  Heart rate-    Respiratory rate-    Temperature-  Pulse oximetry-   Wt Readings from Last 3 Encounters:   11/04/21 220 lb (99.8 kg)   10/28/21 220 lb 11.2 oz (100.1 kg)   10/07/21 222 lb 9.6 oz (101 kg)       Constitutional - alert, well appearing, and in no distress  Eyes - extraocular eye movements intact, left eye normal, right eye normal, no conjunctivitis noted  Neck - symmetric, no obvious masses noted  Respiratory- no increased work of breathing; no visible signs of difficulty breathing or respiratory distress  Cardiovascular - Extremities - not examined  Musculoskeletal - no abnormalities noted  Skin - normal coloration, no rashes, no suspicious skin lesions noted on face or other exposed areas visible through virtual encounter  Other pertinent observable physical exam findings - NA    Assessment / Plan   Diagnosis Orders   1. COVID-19  ibuprofen (IBU) 600 MG tablet    azithromycin (ZITHROMAX) 250 MG tablet    predniSONE (DELTASONE) 20 MG tablet    brompheniramine-pseudoephedrine-DM (BROMFED DM) 2-30-10 MG/5ML syrup   2.  Shortness of breath  albuterol sulfate HFA (VENTOLIN HFA) 108 (90 Base) MCG/ACT inhaler       RTO: Return if symptoms worsen or fail to improve. An electronic signature was used to authenticate this note.  ---- Teja Garcia MD on 1/11/2022 at [address] [place] [address] were provided through a video synchronous discussion virtually to substitute for in-person clinic visit. Pursuant to the emergency declaration under the [facility] [address] authority and the [employer] Act, this Virtual Visit was conducted with patient's (and/or legal guardian's) consent, to reduce the patient's risk of exposure to COVID-19 and provide necessary medical care. The patient (and/or legal guardian) has also been advised to contact this office for worsening conditions or problems, and seek emergency medical treatment and/or call 911 if deemed necessary. Patient's location: home address in [facility]    Physician  location other address in [facility]   Other people involved in call none. This visit was completed virtually using Doxy. me

## 2022-01-27 ENCOUNTER — NURSE ONLY (OUTPATIENT)
Dept: FAMILY MEDICINE CLINIC | Age: 57
End: 2022-01-27
Payer: COMMERCIAL

## 2022-01-27 DIAGNOSIS — Z23 NEED FOR HEPATITIS VACCINATION: Primary | ICD-10-CM

## 2022-01-27 PROCEDURE — 90746 HEPB VACCINE 3 DOSE ADULT IM: CPT | Performed by: FAMILY MEDICINE

## 2022-01-27 PROCEDURE — 90471 IMMUNIZATION ADMIN: CPT | Performed by: FAMILY MEDICINE

## 2022-04-01 DIAGNOSIS — K21.9 GASTROESOPHAGEAL REFLUX DISEASE, UNSPECIFIED WHETHER ESOPHAGITIS PRESENT: ICD-10-CM

## 2022-04-01 RX ORDER — OMEPRAZOLE 20 MG/1
CAPSULE, DELAYED RELEASE ORAL
Qty: 90 CAPSULE | Refills: 1 | OUTPATIENT
Start: 2022-04-01

## 2022-04-11 ENCOUNTER — HOSPITAL ENCOUNTER (OUTPATIENT)
Age: 57
Discharge: HOME | End: 2022-04-11
Payer: COMMERCIAL

## 2022-04-11 DIAGNOSIS — E55.9 VITAMIN D DEFICIENCY, UNSPECIFIED: ICD-10-CM

## 2022-04-11 DIAGNOSIS — I10 PRIMARY HYPERTENSION: ICD-10-CM

## 2022-04-11 DIAGNOSIS — E03.9 HYPOTHYROIDISM, UNSPECIFIED TYPE: ICD-10-CM

## 2022-04-11 DIAGNOSIS — E11.9 TYPE 2 DIABETES MELLITUS WITHOUT COMPLICATION, WITHOUT LONG-TERM CURRENT USE OF INSULIN (HCC): ICD-10-CM

## 2022-04-11 DIAGNOSIS — E78.5 HYPERLIPIDEMIA, UNSPECIFIED HYPERLIPIDEMIA TYPE: ICD-10-CM

## 2022-04-11 LAB
ALBUMIN SERPL-MCNC: 4.5 G/DL (ref 3.5–5.2)
ALP BLD-CCNC: 53 U/L (ref 40–129)
ALT SERPL-CCNC: 37 U/L (ref 0–40)
ANION GAP SERPL CALCULATED.3IONS-SCNC: 11 MMOL/L (ref 7–16)
AST SERPL-CCNC: 36 U/L (ref 0–39)
BASOPHILS ABSOLUTE: 0.03 E9/L (ref 0–0.2)
BASOPHILS RELATIVE PERCENT: 0.6 % (ref 0–2)
BILIRUB SERPL-MCNC: 1.3 MG/DL (ref 0–1.2)
BUN BLDV-MCNC: 18 MG/DL (ref 6–20)
CALCIUM SERPL-MCNC: 10 MG/DL (ref 8.6–10.2)
CHLORIDE BLD-SCNC: 102 MMOL/L (ref 98–107)
CHOLESTEROL, TOTAL: 228 MG/DL (ref 0–199)
CO2: 27 MMOL/L (ref 22–29)
CREAT SERPL-MCNC: 0.9 MG/DL (ref 0.7–1.2)
EOSINOPHILS ABSOLUTE: 0.09 E9/L (ref 0.05–0.5)
EOSINOPHILS RELATIVE PERCENT: 1.7 % (ref 0–6)
GFR AFRICAN AMERICAN: >60
GFR NON-AFRICAN AMERICAN: >60 ML/MIN/1.73
GLUCOSE BLD-MCNC: 104 MG/DL (ref 74–99)
HBA1C MFR BLD: 5.1 % (ref 4–5.6)
HCT VFR BLD CALC: 49.8 % (ref 37–54)
HDLC SERPL-MCNC: 54 MG/DL
HEMOGLOBIN: 16.7 G/DL (ref 12.5–16.5)
IMMATURE GRANULOCYTES #: 0.01 E9/L
IMMATURE GRANULOCYTES %: 0.2 % (ref 0–5)
LDL CHOLESTEROL CALCULATED: 134 MG/DL (ref 0–99)
LYMPHOCYTES ABSOLUTE: 1.88 E9/L (ref 1.5–4)
LYMPHOCYTES RELATIVE PERCENT: 35.3 % (ref 20–42)
MCH RBC QN AUTO: 32.8 PG (ref 26–35)
MCHC RBC AUTO-ENTMCNC: 33.5 % (ref 32–34.5)
MCV RBC AUTO: 97.8 FL (ref 80–99.9)
MONOCYTES ABSOLUTE: 0.76 E9/L (ref 0.1–0.95)
MONOCYTES RELATIVE PERCENT: 14.3 % (ref 2–12)
NEUTROPHILS ABSOLUTE: 2.56 E9/L (ref 1.8–7.3)
NEUTROPHILS RELATIVE PERCENT: 47.9 % (ref 43–80)
PDW BLD-RTO: 13.7 FL (ref 11.5–15)
PLATELET # BLD: 215 E9/L (ref 130–450)
PMV BLD AUTO: 9.7 FL (ref 7–12)
POTASSIUM SERPL-SCNC: 4.6 MMOL/L (ref 3.5–5)
RBC # BLD: 5.09 E12/L (ref 3.8–5.8)
SODIUM BLD-SCNC: 140 MMOL/L (ref 132–146)
TOTAL PROTEIN: 8 G/DL (ref 6.4–8.3)
TRIGL SERPL-MCNC: 202 MG/DL (ref 0–149)
TSH SERPL DL<=0.05 MIU/L-ACNC: 4.96 UIU/ML (ref 0.27–4.2)
VITAMIN D 25-HYDROXY: 62 NG/ML (ref 30–100)
VLDLC SERPL CALC-MCNC: 40 MG/DL
WBC # BLD: 5.3 E9/L (ref 4.5–11.5)

## 2022-04-11 PROCEDURE — 83036 HEMOGLOBIN GLYCOSYLATED A1C: CPT

## 2022-04-11 PROCEDURE — 82306 VITAMIN D 25 HYDROXY: CPT

## 2022-04-11 PROCEDURE — 36415 COLL VENOUS BLD VENIPUNCTURE: CPT

## 2022-04-11 PROCEDURE — 80061 LIPID PANEL: CPT

## 2022-04-11 PROCEDURE — 80053 COMPREHEN METABOLIC PANEL: CPT

## 2022-04-11 PROCEDURE — 85025 COMPLETE CBC W/AUTO DIFF WBC: CPT

## 2022-04-11 PROCEDURE — 84443 ASSAY THYROID STIM HORMONE: CPT

## 2022-04-15 ENCOUNTER — OFFICE VISIT (OUTPATIENT)
Dept: FAMILY MEDICINE CLINIC | Age: 57
End: 2022-04-15
Payer: COMMERCIAL

## 2022-04-15 VITALS
HEART RATE: 83 BPM | WEIGHT: 222 LBS | TEMPERATURE: 97.8 F | OXYGEN SATURATION: 97 % | HEIGHT: 69 IN | RESPIRATION RATE: 16 BRPM | DIASTOLIC BLOOD PRESSURE: 88 MMHG | SYSTOLIC BLOOD PRESSURE: 144 MMHG | BODY MASS INDEX: 32.88 KG/M2

## 2022-04-15 DIAGNOSIS — M25.511 CHRONIC PAIN OF BOTH SHOULDERS: ICD-10-CM

## 2022-04-15 DIAGNOSIS — M25.512 CHRONIC PAIN OF BOTH SHOULDERS: ICD-10-CM

## 2022-04-15 DIAGNOSIS — G89.29 CHRONIC PAIN OF BOTH SHOULDERS: ICD-10-CM

## 2022-04-15 DIAGNOSIS — E11.9 TYPE 2 DIABETES MELLITUS WITHOUT COMPLICATION, WITHOUT LONG-TERM CURRENT USE OF INSULIN (HCC): ICD-10-CM

## 2022-04-15 DIAGNOSIS — E03.9 HYPOTHYROIDISM, UNSPECIFIED TYPE: ICD-10-CM

## 2022-04-15 DIAGNOSIS — I10 ESSENTIAL HYPERTENSION: Primary | ICD-10-CM

## 2022-04-15 DIAGNOSIS — E78.5 HYPERLIPIDEMIA, UNSPECIFIED HYPERLIPIDEMIA TYPE: ICD-10-CM

## 2022-04-15 DIAGNOSIS — M87.059 AVASCULAR NECROSIS OF BONE OF HIP, UNSPECIFIED LATERALITY (HCC): ICD-10-CM

## 2022-04-15 DIAGNOSIS — K21.9 GASTROESOPHAGEAL REFLUX DISEASE, UNSPECIFIED WHETHER ESOPHAGITIS PRESENT: ICD-10-CM

## 2022-04-15 DIAGNOSIS — M77.12 LATERAL EPICONDYLITIS OF LEFT ELBOW: ICD-10-CM

## 2022-04-15 PROBLEM — Z96.649 HISTORY OF TOTAL HIP REPLACEMENT: Status: ACTIVE | Noted: 2022-03-28

## 2022-04-15 LAB
CREATININE URINE POCT: 300
MICROALBUMIN/CREAT 24H UR: 150 MG/G{CREAT}
MICROALBUMIN/CREAT UR-RTO: ABNORMAL
SEDIMENTATION RATE, ERYTHROCYTE: 12 MM/HR (ref 0–15)

## 2022-04-15 PROCEDURE — 3044F HG A1C LEVEL LT 7.0%: CPT | Performed by: FAMILY MEDICINE

## 2022-04-15 PROCEDURE — 99214 OFFICE O/P EST MOD 30 MIN: CPT | Performed by: FAMILY MEDICINE

## 2022-04-15 PROCEDURE — 82044 UR ALBUMIN SEMIQUANTITATIVE: CPT | Performed by: FAMILY MEDICINE

## 2022-04-15 RX ORDER — LISINOPRIL AND HYDROCHLOROTHIAZIDE 20; 12.5 MG/1; MG/1
1 TABLET ORAL DAILY
Qty: 90 TABLET | Refills: 1 | Status: SHIPPED
Start: 2022-04-15 | End: 2022-10-20 | Stop reason: SDUPTHER

## 2022-04-15 RX ORDER — ATORVASTATIN CALCIUM 20 MG/1
20 TABLET, FILM COATED ORAL DAILY
Qty: 90 TABLET | Refills: 1 | Status: SHIPPED
Start: 2022-04-15 | End: 2022-10-20 | Stop reason: SDUPTHER

## 2022-04-15 RX ORDER — LEVOTHYROXINE SODIUM 137 UG/1
137 TABLET ORAL DAILY
Qty: 90 TABLET | Refills: 1 | Status: SHIPPED
Start: 2022-04-15 | End: 2022-10-20 | Stop reason: SDUPTHER

## 2022-04-15 RX ORDER — METHYLPREDNISOLONE 4 MG/1
TABLET ORAL
Qty: 1 KIT | Refills: 0 | Status: SHIPPED | OUTPATIENT
Start: 2022-04-15 | End: 2022-04-21

## 2022-04-15 RX ORDER — OMEPRAZOLE 20 MG/1
20 CAPSULE, DELAYED RELEASE ORAL DAILY
Qty: 90 CAPSULE | Refills: 1 | Status: SHIPPED
Start: 2022-04-15 | End: 2022-10-20 | Stop reason: SDUPTHER

## 2022-04-15 NOTE — PROGRESS NOTES
CC: Levora Kehr is a 49 y.o. yo male is here for evaluation evaluation for the following acute & chronic medical concerns: Diabetes      HPI:    HTN - on prinzide  HLD - ASCVD 14% +DMII; not on medical therapy  DMII - on metformin to 1000mg BID; last A1C 5.1%  Blurry vision: follows with retinal therapist  Hypothyroidism: on synthroid 150mcg  Obesity -max weight 277lb 1/21 today 222lbs;  working on diet and exercise  RYLEY- not on CPAP; Cannot wear mask  Depression- off of all medication; controlled  GERD - on prilosec  Psoriasis - follows with derm  Elevated protein - did see hematology with no evidence of plasma cell neoplasm; resolved on last labs  Very mild elevated Hg on last labs  Chronic pain both shoulder; worse when sleeping and moves to different locations; also L elbow pain; did see ortho who recommended shoulder MRI which he has not completed as of this time  Hx of avascular necrosis hips b/l    Vitals:   BP (!) 144/88 (Site: Left Upper Arm, Position: Sitting, Cuff Size: Large Adult)   Pulse 83   Temp 97.8 °F (36.6 °C) (Temporal)   Resp 16   Ht 5' 8.5\" (1.74 m)   Wt 222 lb (100.7 kg)   SpO2 97%   BMI 33.26 kg/m²   Wt Readings from Last 3 Encounters:   04/15/22 222 lb (100.7 kg)   11/04/21 220 lb (99.8 kg)   10/28/21 220 lb 11.2 oz (100.1 kg)       PE:  Constitutional - alert, well appearing, and in no distress  Eyes - extraocular eye movements intact, left eye normal, right eye normal, no conjunctivitis noted  Neck - symmetric, no obvious masses noted  Respiratory- clear to auscultation, no wheezes, rales or rhonchi, symmetric air entry; no increased work of breathing  Cardiovascular - normal rate, regular rhythm, normal S1, S2, no murmurs, rubs, clicks or gallops  Extremities - no edema noted  Abdomen - soft, nontender, nondistended  Skin - normal coloration and turgor, no rashes, no suspicious skin lesions noted      A / P:     Diagnosis Orders   1.  Essential hypertension lisinopril-hydroCHLOROthiazide (PRINZIDE;ZESTORETIC) 20-12.5 MG per tablet    CBC with Auto Differential    Comprehensive Metabolic Panel    Lipid Panel   2. Hyperlipidemia, unspecified hyperlipidemia type     3. Type 2 diabetes mellitus without complication, without long-term current use of insulin (HCC)  atorvastatin (LIPITOR) 20 MG tablet    metFORMIN (GLUCOPHAGE) 1000 MG tablet    CBC with Auto Differential    Comprehensive Metabolic Panel    Lipid Panel    Hemoglobin A1C    POCT microalbumin   4. Hypothyroidism, unspecified type  levothyroxine (SYNTHROID) 137 MCG tablet    TSH   5. Gastroesophageal reflux disease, unspecified whether esophagitis present  omeprazole (PRILOSEC) 20 MG delayed release capsule   6. Avascular necrosis of bone of hip, unspecified laterality ([facility] [address]     7. Chronic pain of both shoulders  Sedimentation Rate    methylPREDNISolone (MEDROL DOSEPACK) 4 MG tablet   8. Lateral epicondylitis of left elbow  methylPREDNISolone (MEDROL DOSEPACK) 4 MG tablet       Start lipitor 20mg  Decrease synthroid to 137mcg and recheck TSH prior to next visit  Continue prinzide  continue metformin to 1000mg BID  Obtain ESR; trial of dosepack to help with inflammation  Obtain MRI as recommended    RTO: Return in about 4 months (around 8/15/2022) for chronic disease / routine f/u + 3 weeks BP check.       An electronic signature was used to authenticate this note.  ---- Luis Miguel Hollins MD on 4/15/2022 at 3:57 PM

## 2022-07-12 DIAGNOSIS — E03.9 HYPOTHYROIDISM, UNSPECIFIED TYPE: ICD-10-CM

## 2022-07-12 DIAGNOSIS — E11.9 TYPE 2 DIABETES MELLITUS WITHOUT COMPLICATION, WITHOUT LONG-TERM CURRENT USE OF INSULIN (HCC): ICD-10-CM

## 2022-07-12 DIAGNOSIS — I10 ESSENTIAL HYPERTENSION: ICD-10-CM

## 2022-07-12 LAB
AVERAGE GLUCOSE: NORMAL
BASOPHILS ABSOLUTE: NORMAL
BASOPHILS RELATIVE PERCENT: NORMAL
BUN BLDV-MCNC: NORMAL MG/DL
CALCIUM SERPL-MCNC: NORMAL MG/DL
CHLORIDE BLD-SCNC: NORMAL MMOL/L
CHOLESTEROL, TOTAL: 146 MG/DL
CHOLESTEROL/HDL RATIO: 2.2
CO2: NORMAL
CREAT SERPL-MCNC: NORMAL MG/DL
EOSINOPHILS ABSOLUTE: NORMAL
EOSINOPHILS RELATIVE PERCENT: NORMAL
GFR CALCULATED: NORMAL
GLUCOSE BLD-MCNC: NORMAL MG/DL
HBA1C MFR BLD: 5.6 %
HCT VFR BLD CALC: NORMAL %
HDLC SERPL-MCNC: 67 MG/DL (ref 35–70)
HEMOGLOBIN: NORMAL
LDL CHOLESTEROL CALCULATED: 53 MG/DL (ref 0–160)
LYMPHOCYTES ABSOLUTE: NORMAL
LYMPHOCYTES RELATIVE PERCENT: NORMAL
MCH RBC QN AUTO: NORMAL PG
MCHC RBC AUTO-ENTMCNC: NORMAL G/DL
MCV RBC AUTO: NORMAL FL
MONOCYTES ABSOLUTE: NORMAL
MONOCYTES RELATIVE PERCENT: NORMAL
NEUTROPHILS ABSOLUTE: NORMAL
NEUTROPHILS RELATIVE PERCENT: NORMAL
NONHDLC SERPL-MCNC: NORMAL MG/DL
PDW BLD-RTO: NORMAL %
PLATELET # BLD: NORMAL 10*3/UL
PMV BLD AUTO: NORMAL FL
POTASSIUM SERPL-SCNC: NORMAL MMOL/L
RBC # BLD: NORMAL 10*6/UL
SODIUM BLD-SCNC: NORMAL MMOL/L
TRIGL SERPL-MCNC: 132 MG/DL
TSH SERPL DL<=0.05 MIU/L-ACNC: NORMAL M[IU]/L
VLDLC SERPL CALC-MCNC: 26 MG/DL
WBC # BLD: NORMAL 10*3/UL

## 2022-07-13 DIAGNOSIS — E11.9 TYPE 2 DIABETES MELLITUS WITHOUT COMPLICATION, WITHOUT LONG-TERM CURRENT USE OF INSULIN (HCC): ICD-10-CM

## 2022-07-13 DIAGNOSIS — I10 ESSENTIAL HYPERTENSION: ICD-10-CM

## 2022-07-27 LAB — DIABETIC RETINOPATHY: POSITIVE

## 2022-08-04 ENCOUNTER — OFFICE VISIT (OUTPATIENT)
Dept: FAMILY MEDICINE CLINIC | Age: 57
End: 2022-08-04
Payer: COMMERCIAL

## 2022-08-04 VITALS
BODY MASS INDEX: 32.79 KG/M2 | HEIGHT: 69 IN | TEMPERATURE: 96.8 F | OXYGEN SATURATION: 96 % | WEIGHT: 221.4 LBS | HEART RATE: 74 BPM | SYSTOLIC BLOOD PRESSURE: 140 MMHG | DIASTOLIC BLOOD PRESSURE: 80 MMHG

## 2022-08-04 DIAGNOSIS — R29.898 DECONDITIONED LOW BACK: ICD-10-CM

## 2022-08-04 DIAGNOSIS — Z01.818 PRE-OP EXAM: Primary | ICD-10-CM

## 2022-08-04 PROCEDURE — 99213 OFFICE O/P EST LOW 20 MIN: CPT | Performed by: FAMILY MEDICINE

## 2022-08-04 PROCEDURE — 93000 ELECTROCARDIOGRAM COMPLETE: CPT | Performed by: FAMILY MEDICINE

## 2022-08-04 RX ORDER — OXYCODONE HYDROCHLORIDE AND ACETAMINOPHEN 5; 325 MG/1; MG/1
TABLET ORAL
COMMUNITY
Start: 2022-07-29

## 2022-08-04 SDOH — ECONOMIC STABILITY: FOOD INSECURITY: WITHIN THE PAST 12 MONTHS, THE FOOD YOU BOUGHT JUST DIDN'T LAST AND YOU DIDN'T HAVE MONEY TO GET MORE.: NEVER TRUE

## 2022-08-04 SDOH — ECONOMIC STABILITY: FOOD INSECURITY: WITHIN THE PAST 12 MONTHS, YOU WORRIED THAT YOUR FOOD WOULD RUN OUT BEFORE YOU GOT MONEY TO BUY MORE.: NEVER TRUE

## 2022-08-04 ASSESSMENT — PATIENT HEALTH QUESTIONNAIRE - PHQ9
4. FEELING TIRED OR HAVING LITTLE ENERGY: 0
SUM OF ALL RESPONSES TO PHQ QUESTIONS 1-9: 0
6. FEELING BAD ABOUT YOURSELF - OR THAT YOU ARE A FAILURE OR HAVE LET YOURSELF OR YOUR FAMILY DOWN: 0
SUM OF ALL RESPONSES TO PHQ QUESTIONS 1-9: 0
SUM OF ALL RESPONSES TO PHQ QUESTIONS 1-9: 0
3. TROUBLE FALLING OR STAYING ASLEEP: 0
SUM OF ALL RESPONSES TO PHQ QUESTIONS 1-9: 0
7. TROUBLE CONCENTRATING ON THINGS, SUCH AS READING THE NEWSPAPER OR WATCHING TELEVISION: 0
2. FEELING DOWN, DEPRESSED OR HOPELESS: 0
SUM OF ALL RESPONSES TO PHQ9 QUESTIONS 1 & 2: 0
8. MOVING OR SPEAKING SO SLOWLY THAT OTHER PEOPLE COULD HAVE NOTICED. OR THE OPPOSITE, BEING SO FIGETY OR RESTLESS THAT YOU HAVE BEEN MOVING AROUND A LOT MORE THAN USUAL: 0
1. LITTLE INTEREST OR PLEASURE IN DOING THINGS: 0
10. IF YOU CHECKED OFF ANY PROBLEMS, HOW DIFFICULT HAVE THESE PROBLEMS MADE IT FOR YOU TO DO YOUR WORK, TAKE CARE OF THINGS AT HOME, OR GET ALONG WITH OTHER PEOPLE: 0
9. THOUGHTS THAT YOU WOULD BE BETTER OFF DEAD, OR OF HURTING YOURSELF: 0
5. POOR APPETITE OR OVEREATING: 0

## 2022-08-04 ASSESSMENT — SOCIAL DETERMINANTS OF HEALTH (SDOH): HOW HARD IS IT FOR YOU TO PAY FOR THE VERY BASICS LIKE FOOD, HOUSING, MEDICAL CARE, AND HEATING?: NOT HARD AT ALL

## 2022-08-04 NOTE — PROGRESS NOTES
changes, headaches  Pt denies new chest pain, palpitations, orthopnea, dyspnea on exertion, leg swelling  Pt denies new cough or shortness of breath    Prior to Admission medications    Medication Sig Start Date End Date Taking? Authorizing Provider   oxyCODONE-acetaminophen (PERCOCET) 5-325 MG per tablet take 1 to 2 tablets by mouth every 4 hours if needed for pain for 7 days 7/29/22  Yes Historical Provider, MD   Handicap Placard [id number] by Does not apply route Cannot ambulate >200ft without assistance  Ex: 8/4/2027 8/4/22  Yes Sharon Guy MD   atorvastatin (LIPITOR) 20 MG tablet Take 1 tablet by mouth daily 4/15/22  Yes Sharon Guy MD   levothyroxine (SYNTHROID) 137 MCG tablet Take 1 tablet by mouth Daily take 1 tablet by mouth once daily 4/15/22  Yes Sharon Guy MD   omeprazole (PRILOSEC) 20 MG delayed release capsule Take 1 capsule by mouth Daily take 1 capsule by mouth once daily 4/15/22  Yes Sharon Gyu MD   lisinopril-hydroCHLOROthiazide (PRINZIDE;ZESTORETIC) 20-12.5 MG per tablet Take 1 tablet by mouth daily 4/15/22  Yes Sharon Guy MD   metFORMIN (GLUCOPHAGE) 1000 MG tablet Take 1 tablet by mouth 2 times daily (with meals) 4/15/22  Yes Sharon Guy MD   albuterol sulfate HFA (VENTOLIN HFA) 108 (90 Base) MCG/ACT inhaler Inhale 2 puffs into the lungs 4 times daily as needed for Wheezing 1/11/22  Yes Sharon Guy MD   Blood Glucose Monitoring Suppl (ONE TOUCH ULTRA MINI) w/Device KIT Check blood sugar bid 6/4/21  Yes Eden Zuñiga PA-C       Narendra Dallas  reports that he quit smoking about 4 years ago. His smoking use included cigarettes. He has a 7.50 pack-year smoking history. He has never used smokeless tobacco. He reports that he does not currently use alcohol. He reports that he does not use drugs.    has a past medical history of GERD (gastroesophageal reflux disease), Hypertension, Hypothyroidism, Nephrolithiasis, Sleep apnea, and Thyroid disease. Allergies   Allergen Reactions    Sulfa Antibiotics Rash       I reviewed the patient's past past medical history, past surgical history, family history, social history, medications, and allergies during this visit    Vitals:   BP (!) 140/80   Pulse 74   Temp 96.8 °F (36 °C) (Temporal)   Ht 5' 8.5\" (1.74 m)   Wt 221 lb 6.4 oz (100.4 kg)   SpO2 96%   BMI 33.17 kg/m²     PE:  Constitutional - alert and oriented, well appearing, and in no distress  Eyes: Conjunctivae and EOM are normal. Pupils are equal, round, and reactive to light. Neck - supple, no significant adenopathy,  thyroid exam: thyroid is normal in size without nodules or tenderness  Respiratory- clear to auscultation, no wheezes, rales or rhonchi, symmetric air entry; no increased work of breathing  Cardiovascular - normal rate, regular rhythm, normal S1, S2, no murmurs, rubs, clicks or gallops; Extremities - no pedal edema noted  Abdomen - soft, nontender, nondistended, no masses or organomegaly  Musculoskeletal - no clubbing, cyanosis of extremities noted; no joint deformity or swelling noted; full active range of motion noted without pain  Skin - normal coloration and turgor, no rashes, no suspicious skin lesions noted  Neurological - no obvious CN deficits; normal speech, no focal findings or movement disorder noted  Psychiatric - alert, oriented; has a normal mood and affect; behavior is normal    EKG Interpretation:  normal EKG, normal sinus rhythm, unchanged from previous tracings. Data:  Pertinent labs, imaging, other diagnostic data and other clinician documentation reviewed in electronic medical record. A / P:   Diagnosis Orders   1. Pre-op exam  EKG 12 lead    EKG 12 lead      2. Deconditioned low back  [facility]          At the time of this evaluation: No contraindications to planned surgery.      According to the 2014 ACC/AHA pre-operative risk assessment guidelines Robynn Councilman is at 0.4-0.5% risk (0 independent predictors) for Major Adverse Cardiac Complications according to the Revised Cardiac Risk Index during a/an intermediate risk procedure. RTO: Return for per prior.         An electronic signature was used to authenticate this note.  ---- Aguila Miles MD on 8/4/2022 at 12:38 PM

## 2022-08-04 NOTE — PROGRESS NOTES
2022    Avi Shah (:  1965) is a 49 y.o. male, here for a preventive medicine evaluation. Patient Active Problem List   Diagnosis    Obstructive sleep apnea syndrome    History of nephrolithiasis    Hypothyroidism    Hypertension    GERD (gastroesophageal reflux disease)    Hx of avascular necrosis of capital femoral epiphysis    Hyperlipidemia    Chronic midline low back pain    Psoriasis    Depression    Type 2 diabetes mellitus without complication, without long-term current use of insulin (HCC)    Avascular necrosis of bone of hip ([address]    History of total hip replacement       Review of Systems    Prior to Visit Medications    Medication Sig Taking?  Authorizing Provider   oxyCODONE-acetaminophen (PERCOCET) 5-325 MG per tablet take 1 to 2 tablets by mouth every 4 hours if needed for pain for 7 days Yes Historical Provider, MD   atorvastatin (LIPITOR) 20 MG tablet Take 1 tablet by mouth daily Yes Ruslan Ibarra MD   levothyroxine (SYNTHROID) 137 MCG tablet Take 1 tablet by mouth Daily take 1 tablet by mouth once daily Yes Ruslan Ibarra MD   omeprazole (PRILOSEC) 20 MG delayed release capsule Take 1 capsule by mouth Daily take 1 capsule by mouth once daily Yes Ruslan Ibarra MD   lisinopril-hydroCHLOROthiazide (PRINZIDE;ZESTORETIC) 20-12.5 MG per tablet Take 1 tablet by mouth daily Yes Ruslan Ibarra MD   metFORMIN (GLUCOPHAGE) 1000 MG tablet Take 1 tablet by mouth 2 times daily (with meals) Yes Ruslan Ibarra MD   albuterol sulfate HFA (VENTOLIN HFA) 108 (90 Base) MCG/ACT inhaler Inhale 2 puffs into the lungs 4 times daily as needed for Wheezing Yes Ruslan Ibarra MD   Blood Glucose Monitoring Suppl (ONE TOUCH ULTRA MINI) w/Device KIT Check blood sugar bid Yes Ronit Sierra PA-C        Allergies   Allergen Reactions    Sulfa Antibiotics Rash       Past Medical History:   Diagnosis Date    GERD (gastroesophageal reflux disease)     Hypertension Hypothyroidism     Nephrolithiasis     Sleep apnea     Thyroid disease        Past Surgical History:   Procedure Laterality Date    COLONOSCOPY      few years ago w [place] hips    KIDNEY STONE REMOVAL      LITHOTRIPSY      UPPER GASTROINTESTINAL ENDOSCOPY      years ago       Social History     Socioeconomic History    Marital status:      Spouse name: Not on file    Number of children: 4    Years of education: Not on file    Highest education level: Not on file   Occupational History    Occupation:  at [employer] Use    Smoking status: Former     Packs/day: 0.25     Years: 30.00     Pack years: 7.50     Types: Cigarettes     Quit date: 2018     Years since quittin.4    Smokeless tobacco: Never   Vaping Use    Vaping Use: Never used   Substance and Sexual Activity    Alcohol use: Not Currently     Alcohol/week: 0.0 standard drinks     Comment: socially    Drug use: No    Sexual activity: Yes     Partners: Female   Other Topics Concern    Not on file   Social History Narrative    Not on file     Social Determinants of Health     Financial Resource Strain: Low Risk     Difficulty of Paying Living Expenses: Not hard at all   Food Insecurity: No Food Insecurity    Worried About Running Out of Food in the Last Year: Never true    Ran Out of Food in the Last Year: Never true   Transportation Needs: Not on file   Physical Activity: Not on file   Stress: Not on file   Social Connections: Not on file   Intimate Partner Violence: Not on file   Housing Stability: Not on file        Family History   Problem Relation Age of Onset    Cancer Mother 77        colon    High Blood Pressure Mother     Cancer Father 77        liver    High Blood Pressure Father     Cancer Paternal Aunt     Cancer Paternal Uncle        ADVANCE DIRECTIVE: N, <no information>    Vitals:    22 1000   BP: (!) 140/80   Pulse: 74   Temp: 96.8 °F (36 °C)   TempSrc: Temporal Maintenance   Topic Date Due    Diabetic foot exam  Never done    Diabetic retinal exam  Never done    Shingles vaccine (1 of 2) Never done    Pneumococcal 0-64 years Vaccine (2 - PCV) 07/15/2022    Flu vaccine (1) 09/01/2022    Colorectal Cancer Screen  09/25/2022    Diabetic microalbuminuria test  04/15/2023    A1C test (Diabetic or Prediabetic)  07/11/2023    Lipids  07/11/2023    Depression Monitoring  08/04/2023    DTaP/Tdap/Td vaccine (2 - Td or Tdap) 08/31/2028    Hepatitis B vaccine  Completed    COVID-19 Vaccine  Completed    Hepatitis C screen  Completed    HIV screen  Completed    Hepatitis A vaccine  Aged Out    Hib vaccine  Aged Out    Meningococcal (ACWY) vaccine  Aged Out       Assessment & Plan   Pre-op exam  -     EKG 12 lead; Future  Chronic pain of both shoulders    No follow-ups on file.          --Kiel Luevano MA

## 2022-08-22 ENCOUNTER — HOSPITAL ENCOUNTER (OUTPATIENT)
Age: 57
Discharge: HOME | End: 2022-08-24

## 2022-08-22 LAB
ABO/RH: NORMAL
ANTIBODY SCREEN: NORMAL

## 2022-08-22 PROCEDURE — 86901 BLOOD TYPING SEROLOGIC RH(D): CPT

## 2022-08-22 PROCEDURE — 86850 RBC ANTIBODY SCREEN: CPT

## 2022-08-22 PROCEDURE — 86900 BLOOD TYPING SEROLOGIC ABO: CPT

## 2022-08-22 PROCEDURE — 87081 CULTURE SCREEN ONLY: CPT

## 2022-08-24 LAB — MRSA CULTURE ONLY: NORMAL

## 2022-08-30 ENCOUNTER — HOSPITAL ENCOUNTER (OUTPATIENT)
Age: 57
Discharge: HOME | End: 2022-09-01

## 2022-08-30 PROCEDURE — 88307 TISSUE EXAM BY PATHOLOGIST: CPT

## 2022-08-30 PROCEDURE — 88304 TISSUE EXAM BY PATHOLOGIST: CPT

## 2022-08-30 PROCEDURE — 88311 DECALCIFY TISSUE: CPT

## 2022-08-31 ENCOUNTER — HOSPITAL ENCOUNTER (OUTPATIENT)
Age: 57
Discharge: HOME | End: 2022-09-02

## 2022-08-31 LAB
ANION GAP SERPL CALCULATED.3IONS-SCNC: 11 MMOL/L (ref 7–16)
BUN BLDV-MCNC: 20 MG/DL (ref 6–20)
CALCIUM SERPL-MCNC: 9.1 MG/DL (ref 8.6–10.2)
CHLORIDE BLD-SCNC: 101 MMOL/L (ref 98–107)
CO2: 23 MMOL/L (ref 22–29)
CREAT SERPL-MCNC: 0.9 MG/DL (ref 0.7–1.2)
GFR AFRICAN AMERICAN: >60
GFR NON-AFRICAN AMERICAN: >60 ML/MIN/1.73
GLUCOSE BLD-MCNC: 161 MG/DL (ref 74–99)
HCT VFR BLD CALC: 37.9 % (ref 37–54)
HEMOGLOBIN: 12.6 G/DL (ref 12.5–16.5)
MCH RBC QN AUTO: 34.3 PG (ref 26–35)
MCHC RBC AUTO-ENTMCNC: 33.2 % (ref 32–34.5)
MCV RBC AUTO: 103.3 FL (ref 80–99.9)
PDW BLD-RTO: 12.2 FL (ref 11.5–15)
PLATELET # BLD: 190 E9/L (ref 130–450)
PMV BLD AUTO: 10.9 FL (ref 7–12)
POTASSIUM SERPL-SCNC: 4.6 MMOL/L (ref 3.5–5)
RBC # BLD: 3.67 E12/L (ref 3.8–5.8)
SODIUM BLD-SCNC: 135 MMOL/L (ref 132–146)
WBC # BLD: 9.6 E9/L (ref 4.5–11.5)

## 2022-08-31 PROCEDURE — 80048 BASIC METABOLIC PNL TOTAL CA: CPT

## 2022-08-31 PROCEDURE — 85027 COMPLETE CBC AUTOMATED: CPT

## 2022-09-12 ENCOUNTER — HOSPITAL ENCOUNTER (EMERGENCY)
Age: 57
Discharge: LEFT BEFORE DISCHARGE | End: 2022-09-12

## 2022-09-12 VITALS — TEMPERATURE: 97.3 F | OXYGEN SATURATION: 98 % | HEART RATE: 94 BPM | RESPIRATION RATE: 19 BRPM

## 2022-10-11 DIAGNOSIS — E03.9 HYPOTHYROIDISM, UNSPECIFIED TYPE: ICD-10-CM

## 2022-10-11 DIAGNOSIS — E11.9 TYPE 2 DIABETES MELLITUS WITHOUT COMPLICATION, WITHOUT LONG-TERM CURRENT USE OF INSULIN (HCC): ICD-10-CM

## 2022-10-11 DIAGNOSIS — I10 ESSENTIAL HYPERTENSION: ICD-10-CM

## 2022-10-11 DIAGNOSIS — K21.9 GASTROESOPHAGEAL REFLUX DISEASE, UNSPECIFIED WHETHER ESOPHAGITIS PRESENT: ICD-10-CM

## 2022-10-11 RX ORDER — LISINOPRIL AND HYDROCHLOROTHIAZIDE 20; 12.5 MG/1; MG/1
TABLET ORAL
Qty: 90 TABLET | Refills: 1 | OUTPATIENT
Start: 2022-10-11

## 2022-10-11 RX ORDER — ATORVASTATIN CALCIUM 20 MG/1
TABLET, FILM COATED ORAL
Qty: 90 TABLET | Refills: 1 | OUTPATIENT
Start: 2022-10-11

## 2022-10-11 RX ORDER — OMEPRAZOLE 20 MG/1
CAPSULE, DELAYED RELEASE ORAL
Qty: 90 CAPSULE | Refills: 1 | OUTPATIENT
Start: 2022-10-11

## 2022-10-11 RX ORDER — LEVOTHYROXINE SODIUM 137 UG/1
TABLET ORAL
Qty: 90 TABLET | Refills: 1 | OUTPATIENT
Start: 2022-10-11

## 2022-10-20 DIAGNOSIS — E11.9 TYPE 2 DIABETES MELLITUS WITHOUT COMPLICATION, WITHOUT LONG-TERM CURRENT USE OF INSULIN (HCC): ICD-10-CM

## 2022-10-20 DIAGNOSIS — E03.9 HYPOTHYROIDISM, UNSPECIFIED TYPE: ICD-10-CM

## 2022-10-20 DIAGNOSIS — I10 ESSENTIAL HYPERTENSION: ICD-10-CM

## 2022-10-20 DIAGNOSIS — K21.9 GASTROESOPHAGEAL REFLUX DISEASE, UNSPECIFIED WHETHER ESOPHAGITIS PRESENT: ICD-10-CM

## 2022-10-20 RX ORDER — LEVOTHYROXINE SODIUM 137 UG/1
137 TABLET ORAL DAILY
Qty: 90 TABLET | Refills: 0 | Status: SHIPPED | OUTPATIENT
Start: 2022-10-20

## 2022-10-20 RX ORDER — OMEPRAZOLE 20 MG/1
20 CAPSULE, DELAYED RELEASE ORAL DAILY
Qty: 90 CAPSULE | Refills: 0 | Status: SHIPPED | OUTPATIENT
Start: 2022-10-20

## 2022-10-20 RX ORDER — LISINOPRIL AND HYDROCHLOROTHIAZIDE 20; 12.5 MG/1; MG/1
1 TABLET ORAL DAILY
Qty: 90 TABLET | Refills: 0 | Status: SHIPPED | OUTPATIENT
Start: 2022-10-20

## 2022-10-20 RX ORDER — ATORVASTATIN CALCIUM 20 MG/1
20 TABLET, FILM COATED ORAL DAILY
Qty: 90 TABLET | Refills: 0 | Status: SHIPPED | OUTPATIENT
Start: 2022-10-20

## 2022-10-20 NOTE — TELEPHONE ENCOUNTER
Medication Refill Request    LOV 8/4/2022  NOV 11/8/2022    Lab Results   Component Value Date    CREATININE 0.9 08/31/2022

## 2022-11-08 ENCOUNTER — OFFICE VISIT (OUTPATIENT)
Dept: FAMILY MEDICINE CLINIC | Age: 57
End: 2022-11-08
Payer: COMMERCIAL

## 2022-11-08 VITALS
BODY MASS INDEX: 34.28 KG/M2 | TEMPERATURE: 97.5 F | OXYGEN SATURATION: 98 % | SYSTOLIC BLOOD PRESSURE: 144 MMHG | WEIGHT: 228.8 LBS | HEART RATE: 68 BPM | DIASTOLIC BLOOD PRESSURE: 82 MMHG

## 2022-11-08 DIAGNOSIS — Z23 NEED FOR VACCINATION: ICD-10-CM

## 2022-11-08 DIAGNOSIS — Z12.11 SCREEN FOR COLON CANCER: ICD-10-CM

## 2022-11-08 DIAGNOSIS — E11.9 TYPE 2 DIABETES MELLITUS WITHOUT COMPLICATION, WITHOUT LONG-TERM CURRENT USE OF INSULIN (HCC): ICD-10-CM

## 2022-11-08 DIAGNOSIS — E03.9 HYPOTHYROIDISM, UNSPECIFIED TYPE: ICD-10-CM

## 2022-11-08 DIAGNOSIS — E78.5 HYPERLIPIDEMIA, UNSPECIFIED HYPERLIPIDEMIA TYPE: ICD-10-CM

## 2022-11-08 DIAGNOSIS — I10 ESSENTIAL HYPERTENSION: Primary | ICD-10-CM

## 2022-11-08 DIAGNOSIS — K21.9 GASTROESOPHAGEAL REFLUX DISEASE, UNSPECIFIED WHETHER ESOPHAGITIS PRESENT: ICD-10-CM

## 2022-11-08 LAB
HBA1C MFR BLD: 5.4 %
TSH SERPL DL<=0.05 MIU/L-ACNC: 3.5 UIU/ML (ref 0.27–4.2)

## 2022-11-08 PROCEDURE — 99214 OFFICE O/P EST MOD 30 MIN: CPT | Performed by: FAMILY MEDICINE

## 2022-11-08 PROCEDURE — 3074F SYST BP LT 130 MM HG: CPT | Performed by: FAMILY MEDICINE

## 2022-11-08 PROCEDURE — 3078F DIAST BP <80 MM HG: CPT | Performed by: FAMILY MEDICINE

## 2022-11-08 PROCEDURE — 3044F HG A1C LEVEL LT 7.0%: CPT | Performed by: FAMILY MEDICINE

## 2022-11-08 PROCEDURE — 90471 IMMUNIZATION ADMIN: CPT | Performed by: FAMILY MEDICINE

## 2022-11-08 PROCEDURE — 90677 PCV20 VACCINE IM: CPT | Performed by: FAMILY MEDICINE

## 2022-11-08 PROCEDURE — 83036 HEMOGLOBIN GLYCOSYLATED A1C: CPT | Performed by: FAMILY MEDICINE

## 2022-11-08 PROCEDURE — 90674 CCIIV4 VAC NO PRSV 0.5 ML IM: CPT | Performed by: FAMILY MEDICINE

## 2022-11-08 PROCEDURE — 90472 IMMUNIZATION ADMIN EACH ADD: CPT | Performed by: FAMILY MEDICINE

## 2022-11-08 RX ORDER — ATORVASTATIN CALCIUM 20 MG/1
20 TABLET, FILM COATED ORAL DAILY
Qty: 90 TABLET | Refills: 1 | Status: SHIPPED | OUTPATIENT
Start: 2022-11-08

## 2022-11-08 RX ORDER — LISINOPRIL AND HYDROCHLOROTHIAZIDE 20; 12.5 MG/1; MG/1
1 TABLET ORAL DAILY
Qty: 90 TABLET | Refills: 1 | Status: SHIPPED | OUTPATIENT
Start: 2022-11-08

## 2022-11-08 RX ORDER — OMEPRAZOLE 20 MG/1
20 CAPSULE, DELAYED RELEASE ORAL DAILY
Qty: 90 CAPSULE | Refills: 0 | Status: SHIPPED | OUTPATIENT
Start: 2022-11-08

## 2022-11-08 RX ORDER — AMLODIPINE BESYLATE 5 MG/1
5 TABLET ORAL DAILY
Qty: 90 TABLET | Refills: 1 | Status: SHIPPED | OUTPATIENT
Start: 2022-11-08

## 2022-11-08 RX ORDER — LEVOTHYROXINE SODIUM 137 UG/1
137 TABLET ORAL DAILY
Qty: 90 TABLET | Refills: 1 | Status: SHIPPED | OUTPATIENT
Start: 2022-11-08

## 2022-11-08 ASSESSMENT — PATIENT HEALTH QUESTIONNAIRE - PHQ9
SUM OF ALL RESPONSES TO PHQ QUESTIONS 1-9: 0
1. LITTLE INTEREST OR PLEASURE IN DOING THINGS: 0
SUM OF ALL RESPONSES TO PHQ QUESTIONS 1-9: 0
SUM OF ALL RESPONSES TO PHQ QUESTIONS 1-9: 0
SUM OF ALL RESPONSES TO PHQ9 QUESTIONS 1 & 2: 0
SUM OF ALL RESPONSES TO PHQ QUESTIONS 1-9: 0
2. FEELING DOWN, DEPRESSED OR HOPELESS: 0

## 2022-11-08 NOTE — PROGRESS NOTES
CC: Yogesh Flowers is a 56 y.o. yo male is here for evaluation evaluation for the following acute & chronic medical concerns: Diabetes (4-month follow-up)      HPI:    HTN - on prinzide; in pain today on the R shoulder causing increase in BP  HLD: on lipitor 20mg  DMII - on metformin to 1000mg BID; A1C 5.6%  Blurry vision: follows with retinal therapist  Hypothyroidism: on synthroid 137mcg, recent change in dosing  Obesity -max weight 277lb 1/21 today 228lbs;  continues to work on diet and exercise  RYLEY- not on CPAP; Cannot wear mask  Depression- off of all medication; controlled  GERD - on prilosec  Psoriasis - follows with derm  Elevated protein - did see hematology with no evidence of plasma cell neoplasm; resolved on last labs  Hx of avascular necrosis hips b/l  S/p L shoulder hemiarthroplasty; planning R shoulder hemiarthroplasty in Dec/Yordan      Vitals:   BP (!) 144/82   Pulse 68   Temp 97.5 °F (36.4 °C) (Temporal)   Wt 228 lb 12.8 oz (103.8 kg)   SpO2 98%   BMI 34.28 kg/m²   Wt Readings from Last 3 Encounters:   11/08/22 228 lb 12.8 oz (103.8 kg)   08/04/22 221 lb 6.4 oz (100.4 kg)   04/15/22 222 lb (100.7 kg)       PE:  Constitutional - alert, well appearing, and in no distress  Eyes - extraocular eye movements intact, left eye normal, right eye normal, no conjunctivitis noted  Neck - symmetric, no obvious masses noted  Respiratory- clear to auscultation, no wheezes, rales or rhonchi, symmetric air entry; no increased work of breathing  Cardiovascular - normal rate, regular rhythm, normal S1, S2, no murmurs, rubs, clicks or gallops  Extremities - no edema noted  Abdomen - soft, nontender, nondistended  Skin - normal coloration and turgor, no rashes, no suspicious skin lesions noted      A / P:     Diagnosis Orders   1. Essential hypertension  lisinopril-hydroCHLOROthiazide (PRINZIDE;ZESTORETIC) 20-12.5 MG per tablet    amLODIPine (NORVASC) 5 MG tablet      2.  Type 2 diabetes mellitus without complication, without long-term current use of insulin (HCC)  atorvastatin (LIPITOR) 20 MG tablet    metFORMIN (GLUCOPHAGE) 1000 MG tablet    CBC with Auto Differential    Comprehensive Metabolic Panel    Lipid Panel    TSH    Vitamin D 25 Hydroxy    Hemoglobin A1C    Vitamin B12 & Folate    Urinalysis    MICROALBUMIN / CREATININE URINE RATIO    POCT glycosylated hemoglobin (Hb A1C)      3. Hypothyroidism, unspecified type  levothyroxine (SYNTHROID) 137 MCG tablet    TSH      4. Gastroesophageal reflux disease, unspecified whether esophagitis present  omeprazole (PRILOSEC) 20 MG delayed release capsule      5. Need for vaccination  Pneumococcal, PCV20, PREVNAR 21, (age 25 yrs+), IM, PF    Influenza, FLUCELVAX, (age 10 mo+), IM, Preservative Free, 0.5 mL      6. Screen for colon cancer  [facility] - Cecile Juares MD,Gastroenterology, [facility]      7. Hyperlipidemia, unspecified hyperlipidemia type            Add norvasc 5mg  Continue prinzide  Continue lipitor 20mg  Obtain TSH today  continue metformin to 1000mg BID    RTO: Return in about 4 months (around 3/8/2023) for chronic disease / routine f/u + 2 week BP check with me.       An electronic signature was used to authenticate this note.  ---- Ryan Huddleston MD on 11/8/2022 at 10:21 AM

## 2022-11-22 ENCOUNTER — OFFICE VISIT (OUTPATIENT)
Dept: FAMILY MEDICINE CLINIC | Age: 57
End: 2022-11-22
Payer: COMMERCIAL

## 2022-11-22 VITALS
BODY MASS INDEX: 35.48 KG/M2 | HEART RATE: 78 BPM | DIASTOLIC BLOOD PRESSURE: 86 MMHG | TEMPERATURE: 97.7 F | OXYGEN SATURATION: 97 % | WEIGHT: 236.8 LBS | SYSTOLIC BLOOD PRESSURE: 138 MMHG

## 2022-11-22 DIAGNOSIS — M25.511 CHRONIC RIGHT SHOULDER PAIN: ICD-10-CM

## 2022-11-22 DIAGNOSIS — G89.29 CHRONIC RIGHT SHOULDER PAIN: ICD-10-CM

## 2022-11-22 DIAGNOSIS — I10 PRIMARY HYPERTENSION: Primary | ICD-10-CM

## 2022-11-22 PROCEDURE — 3078F DIAST BP <80 MM HG: CPT | Performed by: FAMILY MEDICINE

## 2022-11-22 PROCEDURE — 3074F SYST BP LT 130 MM HG: CPT | Performed by: FAMILY MEDICINE

## 2022-11-22 PROCEDURE — 99213 OFFICE O/P EST LOW 20 MIN: CPT | Performed by: FAMILY MEDICINE

## 2022-11-22 NOTE — PROGRESS NOTES
CC: Yuri Castorena is a 56 y.o. yo male is here for evaluation evaluation for the following acute & chronic medical concerns: Hypertension (2-week BP check)      HPI:    HTN - on prinzide;  we did add norvasc 5mg with improved BP  S/p L shoulder hemiarthroplasty; planning R shoulder hemiarthroplasty in Dec/Jan: persistent pain; was on percocet temporarily but taking a break from that      Vitals:   /86 (Site: Right Upper Arm, Position: Sitting)   Pulse 78   Temp 97.7 °F (36.5 °C) (Temporal)   Wt 236 lb 12.8 oz (107.4 kg)   SpO2 97%   BMI 35.48 kg/m²   Wt Readings from Last 3 Encounters:   11/22/22 236 lb 12.8 oz (107.4 kg)   11/08/22 228 lb 12.8 oz (103.8 kg)   08/04/22 221 lb 6.4 oz (100.4 kg)       PE:  Constitutional - alert, well appearing, and in no distress  Eyes - extraocular eye movements intact, left eye normal, right eye normal, no conjunctivitis noted  Neck - symmetric, no obvious masses noted  Respiratory- clear to auscultation, no wheezes, rales or rhonchi, symmetric air entry; no increased work of breathing  Cardiovascular - normal rate, regular rhythm, normal S1, S2, no murmurs, rubs, clicks or gallops  Extremities - no edema noted  Abdomen - soft, nontender, nondistended  Skin - normal coloration and turgor, no rashes, no suspicious skin lesions noted      A / P:     Diagnosis Orders   1. Primary hypertension        2. Chronic right shoulder pain  diclofenac (VOLTAREN) 50 MG EC tablet          Continue the added norvasc 5mg  Short course of diclofenac for symptom relief for shoulder until surgery  Discussed risk and benefits      RTO: Return in about 4 months (around 3/22/2023) for chronic disease / routine f/u.       An electronic signature was used to authenticate this note.  ---- Garry Walker MD on 11/22/2022 at 9:03 AM

## 2022-12-27 DIAGNOSIS — R06.02 SHORTNESS OF BREATH: ICD-10-CM

## 2022-12-27 RX ORDER — ALBUTEROL SULFATE 90 UG/1
2 AEROSOL, METERED RESPIRATORY (INHALATION) 4 TIMES DAILY PRN
Qty: 1 EACH | Refills: 0 | Status: SHIPPED | OUTPATIENT
Start: 2022-12-27

## 2023-01-03 ENCOUNTER — TELEMEDICINE (OUTPATIENT)
Dept: FAMILY MEDICINE CLINIC | Age: 58
End: 2023-01-03
Payer: COMMERCIAL

## 2023-01-03 DIAGNOSIS — J06.9 UPPER RESPIRATORY TRACT INFECTION, UNSPECIFIED TYPE: Primary | ICD-10-CM

## 2023-01-03 PROCEDURE — 99213 OFFICE O/P EST LOW 20 MIN: CPT | Performed by: PHYSICIAN ASSISTANT

## 2023-01-03 RX ORDER — BROMPHENIRAMINE MALEATE, PSEUDOEPHEDRINE HYDROCHLORIDE, AND DEXTROMETHORPHAN HYDROBROMIDE 2; 30; 10 MG/5ML; MG/5ML; MG/5ML
5 SYRUP ORAL 4 TIMES DAILY PRN
Qty: 240 ML | Refills: 0 | Status: SHIPPED | OUTPATIENT
Start: 2023-01-03 | End: 2023-02-02

## 2023-01-03 RX ORDER — DOXYCYCLINE HYCLATE 100 MG
100 TABLET ORAL 2 TIMES DAILY WITH MEALS
Qty: 20 TABLET | Refills: 0 | Status: SHIPPED | OUTPATIENT
Start: 2023-01-03 | End: 2023-01-13

## 2023-01-03 ASSESSMENT — ENCOUNTER SYMPTOMS
ABDOMINAL PAIN: 0
NAUSEA: 0
DIARRHEA: 1
COUGH: 1
VOMITING: 0
SHORTNESS OF BREATH: 0

## 2023-01-03 NOTE — PROGRESS NOTES
1/3/2023    TELEHEALTH EVALUATION -- Audio/Visual (During NVSTK-59 public health emergency)    HPI:    Gladys Dalton (:  1965) has requested an audio/video evaluation for the following concern(s):    Patient presents for URI symptoms  Symptoms began   Productive cough, facial pain, diarrhea, and fatigue  Wife was sick last week  Denies fever, chills, body aches, CP  Some SOB with cold air and far distances- this has improved since initially  Has albuterol inhaler which has been helping  Not taking anything OTC  Reports past history of smoking  Negative home covid test    Review of Systems   Constitutional:  Positive for fatigue. Negative for chills and fever. HENT:  Positive for congestion. Negative for ear pain. Eyes:  Negative for visual disturbance. Respiratory:  Positive for cough. Negative for shortness of breath. Cardiovascular:  Negative for chest pain and palpitations. Gastrointestinal:  Positive for diarrhea. Negative for abdominal pain, nausea and vomiting. Genitourinary:  Negative for dysuria and frequency. Skin:  Negative for rash. Prior to Visit Medications    Medication Sig Taking?  Authorizing Provider   brompheniramine-pseudoephedrine-DM (BROMFED DM) 2-30-10 MG/5ML syrup Take 5 mLs by mouth 4 times daily as needed for Congestion or Cough Yes Ronit Sierra PA-C   doxycycline hyclate (VIBRA-TABS) 100 MG tablet Take 1 tablet by mouth 2 times daily (with meals) for 10 days Yes Ronit Sierra PA-C   albuterol sulfate HFA (VENTOLIN HFA) 108 (90 Base) MCG/ACT inhaler Inhale 2 puffs into the lungs 4 times daily as needed for Wheezing Yes Rnoit Sierra PA-C   diclofenac (VOLTAREN) 50 MG EC tablet Take 1 tablet by mouth 2 times daily (with meals) Yes Fantasma Perez MD   atorvastatin (LIPITOR) 20 MG tablet Take 1 tablet by mouth daily Yes Jeff Colon MD   levothyroxine (SYNTHROID) 137 MCG tablet Take 1 tablet by mouth Daily take 1 tablet by mouth once daily Yes Meryl Anderson MD   omeprazole (PRILOSEC) 20 MG delayed release capsule Take 1 capsule by mouth Daily take 1 capsule by mouth once daily Yes Meryl Anderson MD   lisinopril-hydroCHLOROthiazide (PRINZIDE;ZESTORETIC) 20-12.5 MG per tablet Take 1 tablet by mouth daily Yes Meryl Anderson MD   metFORMIN (GLUCOPHAGE) 1000 MG tablet Take 1 tablet by mouth 2 times daily (with meals) Yes Meryl Anderson MD   amLODIPine (NORVASC) 5 MG tablet Take 1 tablet by mouth daily Yes Meryl Anderson MD   Handrené Quintana MISC by Does not apply route Cannot ambulate >200ft without assistance  Ex: 2027 Yes Meryl Anderson MD   Blood Glucose Monitoring Suppl (ONE TOUCH ULTRA MINI) w/Device KIT Check blood sugar bid Yes Digna Pérez PA-C       Social History     Tobacco Use    Smoking status: Former     Packs/day: 0.25     Years: 30.00     Pack years: 7.50     Types: Cigarettes     Quit date: 2018     Years since quittin.8    Smokeless tobacco: Never   Vaping Use    Vaping Use: Never used   Substance Use Topics    Alcohol use: Not Currently     Alcohol/week: 0.0 standard drinks     Comment: socially    Drug use: No      PHYSICAL EXAMINATION:  [ INSTRUCTIONS:  \"[x]\" Indicates a positive item  \"[]\" Indicates a negative item  -- DELETE ALL ITEMS NOT EXAMINED]  Constitutional: [x] Appears well-developed and well-nourished [x] No apparent distress      [] Abnormal-   Mental status  [x] Alert and awake  [x] Oriented to person/place/time [x]Able to follow commands      Eyes:  EOM    [x]  Normal  [] Abnormal-  Sclera  [x]  Normal  [] Abnormal -         Discharge [x]  None visible  [] Abnormal -    HENT:   [x] Normocephalic, atraumatic.   [] Abnormal   [x] Mouth/Throat: Mucous membranes are moist.     External Ears [x] Normal  [] Abnormal-     Neck: [x] No visualized mass     Pulmonary/Chest: [x] Respiratory effort normal.  [x] No visualized signs of difficulty breathing or respiratory distress        [] Abnormal- Musculoskeletal:   [x] Normal gait with no signs of ataxia         [x] Normal range of motion of neck        [] Abnormal-       Neurological:        [x] No Facial Asymmetry (Cranial nerve 7 motor function) (limited exam to video visit)          [] No gaze palsy        [] Abnormal-         Skin:        [x] No significant exanthematous lesions or discoloration noted on facial skin         [] Abnormal-            Psychiatric:       [x] Normal Affect [] No Hallucinations        [] Abnormal-     ASSESSMENT/PLAN:  1. Upper respiratory tract infection, unspecified type  Discussed possibly viral but no testing available to due to virtual status of visit. He is more than 10 days out of start of symptoms. Will treat with doxy and bromfed. To call if symptoms persist, change, or worsen. - brompheniramine-pseudoephedrine-DM (BROMFED DM) 2-30-10 MG/5ML syrup; Take 5 mLs by mouth 4 times daily as needed for Congestion or Cough  Dispense: 240 mL; Refill: 0  - doxycycline hyclate (VIBRA-TABS) 100 MG tablet; Take 1 tablet by mouth 2 times daily (with meals) for 10 days  Dispense: 20 tablet; Refill: 0    TeleMedicine Patient Consent    This visit was performed as a virtual video visit using a synchronous, two-way, audio-video telehealth technology platform. Patient identification was verified at the start of the visit, including the patient's telephone number and physical location. I discussed with the patient the nature of our telehealth visits, that:     Due to the nature of an audio- video modality, the only components of a physical exam that could be done are the elements supported by direct observation. I would evaluate the patient and recommend diagnostics and treatments based on my assessment. If it was felt that the patient should be evaluated in clinic or an emergency room setting, then they would be directed there. Our sessions are not being recorded and that personal health information is protected.   Our team would provide follow up care in person if/when the patient needs it. Patient does agree to proceed with telemedicine consultation. Patient's location: [place] address [place]      Provider location other address in [facility]     Other people involved in call: none    Time spent: Greater than Not billed by time    This visit was completed virtually using Doxy. me    --Sharron Ortega PA-C on 1/3/2023 at 2:25 PM    An electronic signature was used to authenticate this note.

## 2023-02-07 ENCOUNTER — TELEPHONE (OUTPATIENT)
Dept: FAMILY MEDICINE CLINIC | Age: 58
End: 2023-02-07

## 2023-02-07 NOTE — TELEPHONE ENCOUNTER
Juliocesar Osorio from [facility] called and patient is refusing the Vit D level unless there is a Dx code that will cover it. Lab did draw specimen and is waiting a response from us by 3:00 pm today. Spoke with Juliocesar Osorio from the lab and she said VIT D is only covered with the Dx of Osteoporosis, Parathyroid disorder, or Vit D Deficiency-I offered DX of fatigue, hypothyroid, DM2, or hypertension and she states None of those are medical necessity Dx. Please advise is you have any more thoughts.

## 2023-02-09 DIAGNOSIS — E11.9 TYPE 2 DIABETES MELLITUS WITHOUT COMPLICATION, WITHOUT LONG-TERM CURRENT USE OF INSULIN (HCC): ICD-10-CM

## 2023-02-09 DIAGNOSIS — I10 ESSENTIAL HYPERTENSION: ICD-10-CM

## 2023-02-09 LAB
ALBUMIN SERPL-MCNC: NORMAL G/DL
ALP BLD-CCNC: NORMAL U/L
ALT SERPL-CCNC: NORMAL U/L
ANION GAP SERPL CALCULATED.3IONS-SCNC: NORMAL MMOL/L
AST SERPL-CCNC: NORMAL U/L
AVERAGE GLUCOSE: NORMAL
BASOPHILS ABSOLUTE: NORMAL
BASOPHILS RELATIVE PERCENT: NORMAL
BILIRUB SERPL-MCNC: NORMAL MG/DL
BILIRUBIN, URINE: NORMAL
BLOOD, URINE: NORMAL
BUN BLDV-MCNC: NORMAL MG/DL
CALCIUM SERPL-MCNC: NORMAL MG/DL
CHLORIDE BLD-SCNC: NORMAL MMOL/L
CHOLESTEROL, TOTAL: 259 MG/DL
CHOLESTEROL/HDL RATIO: 4.5
CLARITY: NORMAL
CO2: NORMAL
COLOR: NORMAL
CREAT SERPL-MCNC: NORMAL MG/DL
CREATININE, URINE: NORMAL
EGFR: 91
EOSINOPHILS ABSOLUTE: NORMAL
EOSINOPHILS RELATIVE PERCENT: NORMAL
FOLATE: NORMAL
GLUCOSE BLD-MCNC: NORMAL MG/DL
GLUCOSE URINE: NORMAL
HBA1C MFR BLD: 5.8 %
HCT VFR BLD CALC: NORMAL %
HDLC SERPL-MCNC: 58 MG/DL (ref 35–70)
HEMOGLOBIN: NORMAL
KETONES, URINE: NORMAL
LDL CHOLESTEROL CALCULATED: NORMAL
LEUKOCYTE ESTERASE, URINE: NORMAL
LYMPHOCYTES ABSOLUTE: NORMAL
LYMPHOCYTES RELATIVE PERCENT: NORMAL
MCH RBC QN AUTO: NORMAL PG
MCHC RBC AUTO-ENTMCNC: NORMAL G/DL
MCV RBC AUTO: NORMAL FL
MICROALBUMIN/CREAT 24H UR: NORMAL MG/G{CREAT}
MICROALBUMIN/CREAT UR-RTO: NORMAL
MONOCYTES ABSOLUTE: NORMAL
MONOCYTES RELATIVE PERCENT: NORMAL
NEUTROPHILS ABSOLUTE: NORMAL
NEUTROPHILS RELATIVE PERCENT: NORMAL
NITRITE, URINE: NORMAL
NONHDLC SERPL-MCNC: NORMAL MG/DL
PDW BLD-RTO: NORMAL %
PH UA: NORMAL
PLATELET # BLD: NORMAL 10*3/UL
PMV BLD AUTO: NORMAL FL
POTASSIUM SERPL-SCNC: NORMAL MMOL/L
PROTEIN UA: NORMAL
RBC # BLD: NORMAL 10*6/UL
SODIUM BLD-SCNC: NORMAL MMOL/L
SPECIFIC GRAVITY, URINE: NORMAL
TOTAL PROTEIN: NORMAL
TRIGL SERPL-MCNC: 552 MG/DL
TSH SERPL DL<=0.05 MIU/L-ACNC: 18.8 UIU/ML
UROBILINOGEN, URINE: NORMAL
VITAMIN B-12: NORMAL
VITAMIN D 25-HYDROXY: NORMAL
VITAMIN D2, 25 HYDROXY: NORMAL
VITAMIN D3,25 HYDROXY: NORMAL
VLDLC SERPL CALC-MCNC: NORMAL MG/DL
WBC # BLD: NORMAL 10*3/UL

## 2023-02-10 DIAGNOSIS — E03.9 HYPOTHYROIDISM, UNSPECIFIED TYPE: ICD-10-CM

## 2023-02-10 DIAGNOSIS — E78.5 HYPERLIPIDEMIA, UNSPECIFIED HYPERLIPIDEMIA TYPE: Primary | ICD-10-CM

## 2023-02-10 RX ORDER — LEVOTHYROXINE SODIUM 150 MCG
150 TABLET ORAL DAILY
Qty: 90 TABLET | Refills: 1
Start: 2023-02-10

## 2023-02-17 DIAGNOSIS — R06.02 SHORTNESS OF BREATH: ICD-10-CM

## 2023-02-17 DIAGNOSIS — E03.9 HYPOTHYROIDISM, UNSPECIFIED TYPE: ICD-10-CM

## 2023-02-17 RX ORDER — LEVOTHYROXINE SODIUM 150 MCG
150 TABLET ORAL DAILY
Qty: 90 TABLET | Refills: 1 | Status: SHIPPED | OUTPATIENT
Start: 2023-02-17

## 2023-02-17 RX ORDER — ALBUTEROL SULFATE 90 UG/1
2 AEROSOL, METERED RESPIRATORY (INHALATION) 4 TIMES DAILY PRN
Qty: 1 EACH | Refills: 0 | Status: SHIPPED | OUTPATIENT
Start: 2023-02-17

## 2023-02-17 NOTE — TELEPHONE ENCOUNTER
Pt states he picked up Synthroid yesterday and he did not get the increased dose. Also, requesting refill on albuterol.   Carleen Russell

## 2023-02-27 ENCOUNTER — TELEPHONE (OUTPATIENT)
Dept: FAMILY MEDICINE CLINIC | Age: 58
End: 2023-02-27

## 2023-02-27 DIAGNOSIS — E03.9 HYPOTHYROIDISM, UNSPECIFIED TYPE: Primary | ICD-10-CM

## 2023-02-27 RX ORDER — LEVOTHYROXINE SODIUM 0.15 MG/1
150 TABLET ORAL DAILY
Qty: 90 TABLET | Refills: 1 | Status: SHIPPED | OUTPATIENT
Start: 2023-02-27

## 2023-03-08 ENCOUNTER — OFFICE VISIT (OUTPATIENT)
Dept: FAMILY MEDICINE CLINIC | Age: 58
End: 2023-03-08
Payer: COMMERCIAL

## 2023-03-08 VITALS
SYSTOLIC BLOOD PRESSURE: 132 MMHG | BODY MASS INDEX: 37.04 KG/M2 | TEMPERATURE: 97.8 F | OXYGEN SATURATION: 97 % | DIASTOLIC BLOOD PRESSURE: 86 MMHG | HEART RATE: 93 BPM | WEIGHT: 247.2 LBS

## 2023-03-08 DIAGNOSIS — K21.9 GASTROESOPHAGEAL REFLUX DISEASE, UNSPECIFIED WHETHER ESOPHAGITIS PRESENT: ICD-10-CM

## 2023-03-08 DIAGNOSIS — I10 ESSENTIAL HYPERTENSION: ICD-10-CM

## 2023-03-08 DIAGNOSIS — E03.9 HYPOTHYROIDISM, UNSPECIFIED TYPE: Primary | ICD-10-CM

## 2023-03-08 DIAGNOSIS — E55.9 VITAMIN D DEFICIENCY, UNSPECIFIED: ICD-10-CM

## 2023-03-08 DIAGNOSIS — R06.02 SHORTNESS OF BREATH: ICD-10-CM

## 2023-03-08 DIAGNOSIS — E11.9 TYPE 2 DIABETES MELLITUS WITHOUT COMPLICATION, WITHOUT LONG-TERM CURRENT USE OF INSULIN (HCC): ICD-10-CM

## 2023-03-08 DIAGNOSIS — Z12.11 SCREEN FOR COLON CANCER: ICD-10-CM

## 2023-03-08 PROCEDURE — 3075F SYST BP GE 130 - 139MM HG: CPT | Performed by: FAMILY MEDICINE

## 2023-03-08 PROCEDURE — 99214 OFFICE O/P EST MOD 30 MIN: CPT | Performed by: FAMILY MEDICINE

## 2023-03-08 PROCEDURE — 3079F DIAST BP 80-89 MM HG: CPT | Performed by: FAMILY MEDICINE

## 2023-03-08 PROCEDURE — 3044F HG A1C LEVEL LT 7.0%: CPT | Performed by: FAMILY MEDICINE

## 2023-03-08 RX ORDER — LISINOPRIL AND HYDROCHLOROTHIAZIDE 20; 12.5 MG/1; MG/1
1 TABLET ORAL DAILY
Qty: 90 TABLET | Refills: 1 | Status: SHIPPED | OUTPATIENT
Start: 2023-03-08

## 2023-03-08 RX ORDER — ATORVASTATIN CALCIUM 20 MG/1
20 TABLET, FILM COATED ORAL DAILY
Qty: 90 TABLET | Refills: 1 | Status: SHIPPED | OUTPATIENT
Start: 2023-03-08

## 2023-03-08 RX ORDER — METFORMIN HYDROCHLORIDE 500 MG/1
1000 TABLET, EXTENDED RELEASE ORAL
Qty: 90 TABLET | Refills: 1 | Status: SHIPPED | OUTPATIENT
Start: 2023-03-08

## 2023-03-08 RX ORDER — ALBUTEROL SULFATE 90 UG/1
2 AEROSOL, METERED RESPIRATORY (INHALATION) 4 TIMES DAILY PRN
Qty: 2 EACH | Refills: 5 | Status: SHIPPED | OUTPATIENT
Start: 2023-03-08

## 2023-03-08 RX ORDER — OMEPRAZOLE 20 MG/1
20 CAPSULE, DELAYED RELEASE ORAL DAILY
Qty: 90 CAPSULE | Refills: 1 | Status: SHIPPED | OUTPATIENT
Start: 2023-03-08

## 2023-03-08 SDOH — ECONOMIC STABILITY: FOOD INSECURITY: WITHIN THE PAST 12 MONTHS, YOU WORRIED THAT YOUR FOOD WOULD RUN OUT BEFORE YOU GOT MONEY TO BUY MORE.: NEVER TRUE

## 2023-03-08 SDOH — ECONOMIC STABILITY: FOOD INSECURITY: WITHIN THE PAST 12 MONTHS, THE FOOD YOU BOUGHT JUST DIDN'T LAST AND YOU DIDN'T HAVE MONEY TO GET MORE.: NEVER TRUE

## 2023-03-08 SDOH — ECONOMIC STABILITY: INCOME INSECURITY: HOW HARD IS IT FOR YOU TO PAY FOR THE VERY BASICS LIKE FOOD, HOUSING, MEDICAL CARE, AND HEATING?: NOT HARD AT ALL

## 2023-03-08 SDOH — ECONOMIC STABILITY: HOUSING INSECURITY
IN THE LAST 12 MONTHS, WAS THERE A TIME WHEN YOU DID NOT HAVE A STEADY PLACE TO SLEEP OR SLEPT IN A SHELTER (INCLUDING NOW)?: NO

## 2023-03-08 ASSESSMENT — PATIENT HEALTH QUESTIONNAIRE - PHQ9
9. THOUGHTS THAT YOU WOULD BE BETTER OFF DEAD, OR OF HURTING YOURSELF: 0
SUM OF ALL RESPONSES TO PHQ QUESTIONS 1-9: 3
2. FEELING DOWN, DEPRESSED OR HOPELESS: 0
3. TROUBLE FALLING OR STAYING ASLEEP: 3
6. FEELING BAD ABOUT YOURSELF - OR THAT YOU ARE A FAILURE OR HAVE LET YOURSELF OR YOUR FAMILY DOWN: 0
4. FEELING TIRED OR HAVING LITTLE ENERGY: 0
8. MOVING OR SPEAKING SO SLOWLY THAT OTHER PEOPLE COULD HAVE NOTICED. OR THE OPPOSITE, BEING SO FIGETY OR RESTLESS THAT YOU HAVE BEEN MOVING AROUND A LOT MORE THAN USUAL: 0
SUM OF ALL RESPONSES TO PHQ QUESTIONS 1-9: 3
SUM OF ALL RESPONSES TO PHQ9 QUESTIONS 1 & 2: 0
SUM OF ALL RESPONSES TO PHQ QUESTIONS 1-9: 3
SUM OF ALL RESPONSES TO PHQ QUESTIONS 1-9: 3
10. IF YOU CHECKED OFF ANY PROBLEMS, HOW DIFFICULT HAVE THESE PROBLEMS MADE IT FOR YOU TO DO YOUR WORK, TAKE CARE OF THINGS AT HOME, OR GET ALONG WITH OTHER PEOPLE: 0
7. TROUBLE CONCENTRATING ON THINGS, SUCH AS READING THE NEWSPAPER OR WATCHING TELEVISION: 0
5. POOR APPETITE OR OVEREATING: 0
1. LITTLE INTEREST OR PLEASURE IN DOING THINGS: 0

## 2023-03-08 ASSESSMENT — LIFESTYLE VARIABLES
HOW OFTEN DO YOU HAVE A DRINK CONTAINING ALCOHOL: 2-3 TIMES A WEEK
HOW MANY STANDARD DRINKS CONTAINING ALCOHOL DO YOU HAVE ON A TYPICAL DAY: 1 OR 2

## 2023-03-08 NOTE — PROGRESS NOTES
CC: Tanika Carroll is a 56 y.o. yo male is here for evaluation evaluation for the following acute & chronic medical concerns: Hypertension (4-month follow-up/), Diabetes (Stopped Metformin x 1 month due to GI problems/), Gastroesophageal Reflux (/), and Hyperlipidemia (/)      HPI:    HTN - on prinzide;  we did add norvasc 5mg with improved BP  HLD: on lipitor 20mg  DMII - as on metformin to 1000mg BID and tolerating this however, as of 1 month ago started to develop GI se and he stopped this; morning fasting ahs been 80-90; last A1C 5.6%  Blurry vision: follows with retinal therapist  Hypothyroidism: was on synthroid 137mcg but TSH was elevated so we changed to 150mcg; we did try to send over namebrand but it was too costly so this was changed back to generic and he is adherent with the dosage of 150mcg as of 1 week ago  Obesity -max weight 277lb 1/21 today 247lbs;  continues to work on diet and exercise but did have some recent weight gain again; see abnormal TSH  RYLEY- not on CPAP; Cannot wear mask  Depression- off of all medication; controlled  GERD - on prilosec  Psoriasis - follows with derm  Elevated protein - did see hematology with no evidence of plasma cell neoplasm; resolved on last labs  Hx of avascular necrosis hips b/l  S/p L shoulder hemiarthroplasty; planning R shoulder hemiarthroplasty which had to be rescheduled    Vitals:   /86 (Site: Left Upper Arm, Position: Sitting, Cuff Size: Large Adult)   Pulse 93   Temp 97.8 °F (36.6 °C) (Temporal)   Wt 247 lb 3.2 oz (112.1 kg)   SpO2 97%   BMI 37.04 kg/m²   Wt Readings from Last 3 Encounters:   03/08/23 247 lb 3.2 oz (112.1 kg)   11/22/22 236 lb 12.8 oz (107.4 kg)   11/08/22 228 lb 12.8 oz (103.8 kg)       PE:  Constitutional - alert, well appearing, and in no distress  Eyes - extraocular eye movements intact, left eye normal, right eye normal, no conjunctivitis noted  Neck - symmetric, no obvious masses noted  Respiratory- clear to auscultation, no wheezes, rales or rhonchi, symmetric air entry; no increased work of breathing  Cardiovascular - normal rate, regular rhythm, normal S1, S2, no murmurs, rubs, clicks or gallops  Extremities - no edema noted  Abdomen - soft, nontender, nondistended  Skin - normal coloration and turgor, no rashes, no suspicious skin lesions noted      A / P:     Diagnosis Orders   1. Hypothyroidism, unspecified type  TSH    TSH      2. Shortness of breath  albuterol sulfate HFA (VENTOLIN HFA) 108 (90 Base) MCG/ACT inhaler      3. Type 2 diabetes mellitus without complication, without long-term current use of insulin (HCC)  atorvastatin (LIPITOR) 20 MG tablet    Hemoglobin A1C    Microalbumin / Creatinine Urine Ratio    Urinalysis    Comprehensive Metabolic Panel    CBC with Auto Differential    Lipid Panel    Vitamin B12 & Folate      4. Gastroesophageal reflux disease, unspecified whether esophagitis present  omeprazole (PRILOSEC) 20 MG delayed release capsule      5. Essential hypertension  lisinopril-hydroCHLOROthiazide (PRINZIDE;ZESTORETIC) 20-12.5 MG per tablet      6. Vitamin D deficiency, unspecified  Vitamin D 25 Hydroxy      7. Screen for colon cancer  JARED - Wei Riggs MD,Gastroenterology, [facility]          Repeat TSH in 4-6 weeks  Restart metformin at 1000mg XR daily for now  Repeat labs in 3 months prior to f/u visit  Consider changing to synthroid in future due to labile TSH  Continue the added norvasc 5mg  Continue prinzide  Continue lipitor 20mg      RTO: Return in about 3 months (around 6/8/2023) for chronic disease / routine f/u.       An electronic signature was used to authenticate this note.  ---- Hali Hinkle MD on 3/8/2023 at 9:49 AM

## 2023-04-03 ENCOUNTER — TELEPHONE (OUTPATIENT)
Dept: FAMILY MEDICINE CLINIC | Age: 58
End: 2023-04-03

## 2023-04-03 DIAGNOSIS — E03.9 HYPOTHYROIDISM, UNSPECIFIED TYPE: ICD-10-CM

## 2023-04-03 RX ORDER — LEVOTHYROXINE SODIUM 0.15 MG/1
150 TABLET ORAL DAILY
Qty: 90 TABLET | Refills: 1 | Status: SHIPPED | OUTPATIENT
Start: 2023-04-03

## 2023-04-03 NOTE — TELEPHONE ENCOUNTER
[facility] called for her  Reggie Finch and left a message. The last Rx for his thyroid hormone was brand Synthroid. The cost for that was $99.  Previously, the generic was $1.  They are asking if he can have the generic or is there some reason he needs the brand? Please advise.

## 2023-04-03 NOTE — TELEPHONE ENCOUNTER
I do not see that I sent name brand or BERNARD on last rx. However, I re-sent the same prescription and specified that generic is ok.

## 2023-04-17 LAB
ALBUMIN SERPL-MCNC: NORMAL G/DL
ALP BLD-CCNC: NORMAL U/L
ALT SERPL-CCNC: NORMAL U/L
ANION GAP SERPL CALCULATED.3IONS-SCNC: NORMAL MMOL/L
AST SERPL-CCNC: NORMAL U/L
AVERAGE GLUCOSE: 126
BASOPHILS ABSOLUTE: NORMAL
BASOPHILS RELATIVE PERCENT: NORMAL
BILIRUB SERPL-MCNC: NORMAL MG/DL
BUN BLDV-MCNC: 20 MG/DL
CALCIUM SERPL-MCNC: NORMAL MG/DL
CHLORIDE BLD-SCNC: NORMAL MMOL/L
CHOLESTEROL, TOTAL: NORMAL
CHOLESTEROL/HDL RATIO: NORMAL
CO2: NORMAL
CREAT SERPL-MCNC: 0.98 MG/DL
CREATININE, URINE: NORMAL
EGFR: NORMAL
EOSINOPHILS ABSOLUTE: NORMAL
EOSINOPHILS RELATIVE PERCENT: NORMAL
GLUCOSE BLD-MCNC: 107 MG/DL
HBA1C MFR BLD: 6 %
HCT VFR BLD CALC: NORMAL %
HDLC SERPL-MCNC: 50 MG/DL (ref 35–70)
HEMOGLOBIN: NORMAL
LDL CHOLESTEROL CALCULATED: 78 MG/DL (ref 0–160)
LYMPHOCYTES ABSOLUTE: NORMAL
LYMPHOCYTES RELATIVE PERCENT: NORMAL
MCH RBC QN AUTO: NORMAL PG
MCHC RBC AUTO-ENTMCNC: NORMAL G/DL
MCV RBC AUTO: NORMAL FL
MICROALBUMIN/CREAT 24H UR: NORMAL MG/G{CREAT}
MICROALBUMIN/CREAT UR-RTO: NORMAL
MICROSCOPIC URINE: NORMAL
MONOCYTES ABSOLUTE: NORMAL
MONOCYTES RELATIVE PERCENT: NORMAL
NEUTROPHILS ABSOLUTE: NORMAL
NEUTROPHILS RELATIVE PERCENT: NORMAL
NONHDLC SERPL-MCNC: NORMAL MG/DL
PLATELET # BLD: NORMAL 10*3/UL
PMV BLD AUTO: NORMAL FL
POTASSIUM SERPL-SCNC: 4.5 MMOL/L
RBC # BLD: NORMAL 10*6/UL
SODIUM BLD-SCNC: NORMAL MMOL/L
TOTAL PROTEIN: NORMAL
TRIGL SERPL-MCNC: NORMAL MG/DL
TSH SERPL DL<=0.05 MIU/L-ACNC: NORMAL M[IU]/L
VITAMIN D 25-HYDROXY: NORMAL
VITAMIN D2, 25 HYDROXY: NORMAL
VITAMIN D3,25 HYDROXY: NORMAL
VLDLC SERPL CALC-MCNC: NORMAL MG/DL
WBC # BLD: NORMAL 10*3/UL

## 2023-09-28 ENCOUNTER — OFFICE VISIT (OUTPATIENT)
Dept: FAMILY MEDICINE CLINIC | Age: 58
End: 2023-09-28
Payer: COMMERCIAL

## 2023-09-28 VITALS
HEIGHT: 71 IN | TEMPERATURE: 98.2 F | OXYGEN SATURATION: 97 % | SYSTOLIC BLOOD PRESSURE: 120 MMHG | WEIGHT: 259.4 LBS | RESPIRATION RATE: 18 BRPM | DIASTOLIC BLOOD PRESSURE: 84 MMHG | BODY MASS INDEX: 36.31 KG/M2 | HEART RATE: 96 BPM

## 2023-09-28 DIAGNOSIS — J32.9 SINOBRONCHITIS: Primary | ICD-10-CM

## 2023-09-28 DIAGNOSIS — J40 SINOBRONCHITIS: ICD-10-CM

## 2023-09-28 DIAGNOSIS — J40 SINOBRONCHITIS: Primary | ICD-10-CM

## 2023-09-28 DIAGNOSIS — J32.9 SINOBRONCHITIS: ICD-10-CM

## 2023-09-28 LAB
INFLUENZA A ANTIGEN, POC: NORMAL
INFLUENZA B ANTIGEN, POC: NORMAL
Lab: NORMAL
PERFORMING INSTRUMENT: NORMAL
QC PASS/FAIL: NORMAL
SARS-COV-2, POC: NORMAL

## 2023-09-28 PROCEDURE — 87804 INFLUENZA ASSAY W/OPTIC: CPT | Performed by: PHYSICIAN ASSISTANT

## 2023-09-28 PROCEDURE — 3079F DIAST BP 80-89 MM HG: CPT | Performed by: PHYSICIAN ASSISTANT

## 2023-09-28 PROCEDURE — 87426 SARSCOV CORONAVIRUS AG IA: CPT | Performed by: PHYSICIAN ASSISTANT

## 2023-09-28 PROCEDURE — 3074F SYST BP LT 130 MM HG: CPT | Performed by: PHYSICIAN ASSISTANT

## 2023-09-28 PROCEDURE — 99213 OFFICE O/P EST LOW 20 MIN: CPT | Performed by: PHYSICIAN ASSISTANT

## 2023-09-28 RX ORDER — AMOXICILLIN AND CLAVULANATE POTASSIUM 875; 125 MG/1; MG/1
1 TABLET, FILM COATED ORAL 2 TIMES DAILY
Qty: 20 TABLET | Refills: 0 | Status: SHIPPED | OUTPATIENT
Start: 2023-09-28 | End: 2023-10-08

## 2023-09-28 RX ORDER — BENZONATATE 100 MG/1
100 CAPSULE ORAL 3 TIMES DAILY PRN
Qty: 30 CAPSULE | Refills: 0 | Status: SHIPPED | OUTPATIENT
Start: 2023-09-28 | End: 2023-10-08

## 2023-09-28 RX ORDER — ALBUTEROL SULFATE 90 UG/1
2 AEROSOL, METERED RESPIRATORY (INHALATION) 4 TIMES DAILY PRN
Qty: 2 EACH | Refills: 5 | Status: SHIPPED | OUTPATIENT
Start: 2023-09-28

## 2023-09-28 NOTE — PROGRESS NOTES
be fever free for 24 hours and symptoms should be improved overall prior to returning to public. Script written for Augmentin, side effects discussed. Increase fluids and rest. Symptomatic relief discussed including Tylenol prn pain/fever. Schedule f/u with PCP in 7-10 days if symptoms persist. To call or to ED sooner if symptoms worsen or change. ED immediately with high or refractory fever, progressive SOB, dyspnea, CP, calf pain/swelling, shaking chills, vomiting, abdominal pain, lethargy, flank pain, or decreased urinary output. Pt verbalizes understanding and is in agreement with plan of care. All questions answered.     Rebecca Aburto PA-C

## 2023-10-02 LAB — SARS-COV-2: NOT DETECTED

## 2023-10-04 DIAGNOSIS — E03.9 HYPOTHYROIDISM, UNSPECIFIED TYPE: ICD-10-CM

## 2023-10-04 RX ORDER — LEVOTHYROXINE SODIUM 0.15 MG/1
150 TABLET ORAL DAILY
Qty: 30 TABLET | Refills: 10 | OUTPATIENT
Start: 2023-10-04

## 2023-10-16 DIAGNOSIS — E03.9 HYPOTHYROIDISM, UNSPECIFIED TYPE: ICD-10-CM

## 2023-10-16 RX ORDER — LEVOTHYROXINE SODIUM 0.15 MG/1
150 TABLET ORAL DAILY
Qty: 90 TABLET | Refills: 0 | Status: SHIPPED | OUTPATIENT
Start: 2023-10-16

## 2023-10-16 NOTE — TELEPHONE ENCOUNTER
Pt called requesting a refill of levothyroxine sent to the [facility] pharmacy

## 2023-10-16 NOTE — TELEPHONE ENCOUNTER
Medication Refill Request    LOV 9/28/2023  NOV Visit date not found    Lab Results   Component Value Date    CREATININE 0.98 04/17/2023

## 2024-01-04 ENCOUNTER — OFFICE VISIT (OUTPATIENT)
Dept: FAMILY MEDICINE CLINIC | Age: 59
End: 2024-01-04
Payer: COMMERCIAL

## 2024-01-04 VITALS
OXYGEN SATURATION: 94 % | HEART RATE: 96 BPM | WEIGHT: 254.8 LBS | BODY MASS INDEX: 36.04 KG/M2 | DIASTOLIC BLOOD PRESSURE: 88 MMHG | TEMPERATURE: 97.7 F | SYSTOLIC BLOOD PRESSURE: 134 MMHG

## 2024-01-04 DIAGNOSIS — E55.9 VITAMIN D DEFICIENCY, UNSPECIFIED: ICD-10-CM

## 2024-01-04 DIAGNOSIS — E03.9 HYPOTHYROIDISM, UNSPECIFIED TYPE: ICD-10-CM

## 2024-01-04 DIAGNOSIS — E53.8 LOW SERUM VITAMIN B12: ICD-10-CM

## 2024-01-04 DIAGNOSIS — E11.9 TYPE 2 DIABETES MELLITUS WITHOUT COMPLICATION, WITHOUT LONG-TERM CURRENT USE OF INSULIN (HCC): ICD-10-CM

## 2024-01-04 DIAGNOSIS — I10 ESSENTIAL HYPERTENSION: Primary | ICD-10-CM

## 2024-01-04 DIAGNOSIS — J40 SINOBRONCHITIS: ICD-10-CM

## 2024-01-04 DIAGNOSIS — E78.5 HYPERLIPIDEMIA, UNSPECIFIED HYPERLIPIDEMIA TYPE: ICD-10-CM

## 2024-01-04 DIAGNOSIS — Z12.11 SCREEN FOR COLON CANCER: ICD-10-CM

## 2024-01-04 DIAGNOSIS — J32.9 SINOBRONCHITIS: ICD-10-CM

## 2024-01-04 DIAGNOSIS — E66.01 SEVERE OBESITY (BMI 35.0-39.9) WITH COMORBIDITY (HCC): ICD-10-CM

## 2024-01-04 PROBLEM — M87.00 AVASCULAR NECROSIS OF BONE (HCC): Status: RESOLVED | Noted: 2024-01-04 | Resolved: 2024-01-04

## 2024-01-04 PROBLEM — M87.059 AVASCULAR NECROSIS OF BONE OF HIP (HCC): Status: RESOLVED | Noted: 2022-04-15 | Resolved: 2024-01-04

## 2024-01-04 PROBLEM — R73.01 IMPAIRED FASTING GLUCOSE: Status: ACTIVE | Noted: 2024-01-04

## 2024-01-04 PROBLEM — M25.9 DISORDER OF SHOULDER: Status: RESOLVED | Noted: 2024-01-04 | Resolved: 2024-01-04

## 2024-01-04 LAB
ALBUMIN SERPL-MCNC: 4.4 G/DL (ref 3.5–5.2)
ALP BLD-CCNC: 78 U/L (ref 40–129)
ALT SERPL-CCNC: 70 U/L (ref 0–40)
ANION GAP SERPL CALCULATED.3IONS-SCNC: 19 MMOL/L (ref 7–16)
AST SERPL-CCNC: 112 U/L (ref 0–39)
BASOPHILS ABSOLUTE: 0.03 K/UL (ref 0–0.2)
BASOPHILS RELATIVE PERCENT: 1 % (ref 0–2)
BILIRUB SERPL-MCNC: 0.9 MG/DL (ref 0–1.2)
BILIRUBIN URINE: NEGATIVE
BUN BLDV-MCNC: 15 MG/DL (ref 6–20)
CALCIUM SERPL-MCNC: 10 MG/DL (ref 8.6–10.2)
CHLORIDE BLD-SCNC: 94 MMOL/L (ref 98–107)
CHOLESTEROL: 214 MG/DL
CO2: 22 MMOL/L (ref 22–29)
COLOR: YELLOW
COMMENT: NORMAL
CREAT SERPL-MCNC: 1.1 MG/DL (ref 0.7–1.2)
CREATININE URINE: 112.5 MG/DL (ref 40–278)
EOSINOPHILS ABSOLUTE: 0.19 K/UL (ref 0.05–0.5)
EOSINOPHILS RELATIVE PERCENT: 3 % (ref 0–6)
FOLATE: 4.1 NG/ML (ref 4.8–24.2)
GFR SERPL CREATININE-BSD FRML MDRD: >60 ML/MIN/1.73M2
GLUCOSE BLD-MCNC: 106 MG/DL (ref 74–99)
GLUCOSE URINE: NEGATIVE MG/DL
HBA1C MFR BLD: 6 %
HCT VFR BLD CALC: 46.5 % (ref 37–54)
HDLC SERPL-MCNC: 46 MG/DL
HEMOGLOBIN: 15.8 G/DL (ref 12.5–16.5)
IMMATURE GRANULOCYTES: 0 % (ref 0–5)
KETONES, URINE: NEGATIVE MG/DL
LDL CHOLESTEROL: 110 MG/DL
LEUKOCYTE ESTERASE, URINE: NEGATIVE
LYMPHOCYTES ABSOLUTE: 2.23 K/UL (ref 1.5–4)
LYMPHOCYTES RELATIVE PERCENT: 34 % (ref 20–42)
MCH RBC QN AUTO: 34.7 PG (ref 26–35)
MCHC RBC AUTO-ENTMCNC: 34 G/DL (ref 32–34.5)
MCV RBC AUTO: 102.2 FL (ref 80–99.9)
MICROALBUMIN/CREAT 24H UR: <12 MG/L (ref 0–19)
MICROALBUMIN/CREAT UR-RTO: NORMAL MCG/MG CREAT (ref 0–30)
MONOCYTES ABSOLUTE: 0.63 K/UL (ref 0.1–0.95)
MONOCYTES RELATIVE PERCENT: 10 % (ref 2–12)
NEUTROPHILS ABSOLUTE: 3.46 K/UL (ref 1.8–7.3)
NEUTROPHILS RELATIVE PERCENT: 53 % (ref 43–80)
NITRITE, URINE: NEGATIVE
PDW BLD-RTO: 13.2 % (ref 11.5–15)
PH UA: 6 (ref 5–9)
PLATELET # BLD: 201 K/UL (ref 130–450)
PMV BLD AUTO: 11 FL (ref 7–12)
POTASSIUM SERPL-SCNC: 4.3 MMOL/L (ref 3.5–5)
PROTEIN UA: NEGATIVE MG/DL
RBC # BLD: 4.55 M/UL (ref 3.8–5.8)
SODIUM BLD-SCNC: 135 MMOL/L (ref 132–146)
SPECIFIC GRAVITY UA: 1.02 (ref 1–1.03)
T4 FREE: 1.4 NG/DL (ref 0.9–1.7)
TOTAL PROTEIN: 9.1 G/DL (ref 6.4–8.3)
TRIGL SERPL-MCNC: 289 MG/DL
TSH SERPL DL<=0.05 MIU/L-ACNC: 24.99 UIU/ML (ref 0.27–4.2)
TURBIDITY: CLEAR
URINE HGB: NEGATIVE
UROBILINOGEN, URINE: 0.2 EU/DL (ref 0–1)
VITAMIN B-12: 451 PG/ML (ref 211–946)
VITAMIN D 25-HYDROXY: 38.6 NG/ML (ref 30–100)
VLDLC SERPL CALC-MCNC: 58 MG/DL
WBC # BLD: 6.6 K/UL (ref 4.5–11.5)

## 2024-01-04 PROCEDURE — 99214 OFFICE O/P EST MOD 30 MIN: CPT | Performed by: FAMILY MEDICINE

## 2024-01-04 PROCEDURE — 3079F DIAST BP 80-89 MM HG: CPT | Performed by: FAMILY MEDICINE

## 2024-01-04 PROCEDURE — 3044F HG A1C LEVEL LT 7.0%: CPT | Performed by: FAMILY MEDICINE

## 2024-01-04 PROCEDURE — 3075F SYST BP GE 130 - 139MM HG: CPT | Performed by: FAMILY MEDICINE

## 2024-01-04 PROCEDURE — 83036 HEMOGLOBIN GLYCOSYLATED A1C: CPT | Performed by: FAMILY MEDICINE

## 2024-01-04 RX ORDER — ALBUTEROL SULFATE 90 UG/1
2 AEROSOL, METERED RESPIRATORY (INHALATION) 4 TIMES DAILY PRN
Qty: 2 EACH | Refills: 5 | Status: SHIPPED | OUTPATIENT
Start: 2024-01-04

## 2024-01-04 RX ORDER — AMLODIPINE BESYLATE 10 MG/1
10 TABLET ORAL DAILY
Qty: 90 TABLET | Refills: 1 | Status: SHIPPED | OUTPATIENT
Start: 2024-01-04

## 2024-01-04 RX ORDER — LISINOPRIL AND HYDROCHLOROTHIAZIDE 20; 12.5 MG/1; MG/1
1 TABLET ORAL DAILY
Qty: 90 TABLET | Refills: 1 | Status: SHIPPED | OUTPATIENT
Start: 2024-01-04

## 2024-01-04 RX ORDER — OXYCODONE AND ACETAMINOPHEN 7.5; 325 MG/1; MG/1
1 TABLET ORAL EVERY 6 HOURS PRN
COMMUNITY
Start: 2023-12-09

## 2024-01-04 RX ORDER — ADHESIVE BANDAGE 3/4"
BANDAGE TOPICAL
Qty: 1 EACH | Refills: 0 | Status: SHIPPED | OUTPATIENT
Start: 2024-01-04

## 2024-01-04 RX ORDER — LANOLIN ALCOHOL/MO/W.PET/CERES
400 CREAM (GRAM) TOPICAL DAILY
Qty: 90 TABLET | Refills: 1 | Status: SHIPPED | OUTPATIENT
Start: 2024-01-04

## 2024-01-04 RX ORDER — ICOSAPENT ETHYL 1000 MG/1
2 CAPSULE ORAL 2 TIMES DAILY
Qty: 360 CAPSULE | Refills: 1 | Status: SHIPPED | OUTPATIENT
Start: 2024-01-04

## 2024-01-04 RX ORDER — ATORVASTATIN CALCIUM 20 MG/1
20 TABLET, FILM COATED ORAL DAILY
Qty: 90 TABLET | Refills: 1 | Status: SHIPPED | OUTPATIENT
Start: 2024-01-04

## 2024-01-04 RX ORDER — LEVOTHYROXINE SODIUM 0.15 MG/1
150 TABLET ORAL DAILY
Qty: 90 TABLET | Refills: 1 | Status: SHIPPED
Start: 2024-01-04 | End: 2024-01-05

## 2024-01-04 RX ORDER — METFORMIN HYDROCHLORIDE 500 MG/1
500 TABLET, EXTENDED RELEASE ORAL
Qty: 90 TABLET | Refills: 1 | Status: SHIPPED | OUTPATIENT
Start: 2024-01-04

## 2024-01-04 ASSESSMENT — PATIENT HEALTH QUESTIONNAIRE - PHQ9
1. LITTLE INTEREST OR PLEASURE IN DOING THINGS: 0
6. FEELING BAD ABOUT YOURSELF - OR THAT YOU ARE A FAILURE OR HAVE LET YOURSELF OR YOUR FAMILY DOWN: 0
SUM OF ALL RESPONSES TO PHQ QUESTIONS 1-9: 6
SUM OF ALL RESPONSES TO PHQ QUESTIONS 1-9: 6
SUM OF ALL RESPONSES TO PHQ9 QUESTIONS 1 & 2: 0
3. TROUBLE FALLING OR STAYING ASLEEP: 3
5. POOR APPETITE OR OVEREATING: 0
2. FEELING DOWN, DEPRESSED OR HOPELESS: 0
4. FEELING TIRED OR HAVING LITTLE ENERGY: 3
8. MOVING OR SPEAKING SO SLOWLY THAT OTHER PEOPLE COULD HAVE NOTICED. OR THE OPPOSITE, BEING SO FIGETY OR RESTLESS THAT YOU HAVE BEEN MOVING AROUND A LOT MORE THAN USUAL: 0
7. TROUBLE CONCENTRATING ON THINGS, SUCH AS READING THE NEWSPAPER OR WATCHING TELEVISION: 0
SUM OF ALL RESPONSES TO PHQ QUESTIONS 1-9: 6
10. IF YOU CHECKED OFF ANY PROBLEMS, HOW DIFFICULT HAVE THESE PROBLEMS MADE IT FOR YOU TO DO YOUR WORK, TAKE CARE OF THINGS AT HOME, OR GET ALONG WITH OTHER PEOPLE: 1
9. THOUGHTS THAT YOU WOULD BE BETTER OFF DEAD, OR OF HURTING YOURSELF: 0
SUM OF ALL RESPONSES TO PHQ QUESTIONS 1-9: 6

## 2024-01-04 NOTE — PROGRESS NOTES
01/04/24 115.6 kg (254 lb 12.8 oz)   09/28/23 117.7 kg (259 lb 6.4 oz)   06/13/23 112.2 kg (247 lb 6.4 oz)       PE:  Constitutional - alert, well appearing, and in no distress  Eyes - extraocular eye movements intact, left eye normal, right eye normal, no conjunctivitis noted  Neck - symmetric, no obvious masses noted  Respiratory- clear to auscultation, no wheezes, rales or rhonchi, symmetric air entry; no increased work of breathing  Cardiovascular - normal rate, regular rhythm, normal S1, S2, no murmurs, rubs, clicks or gallops  Extremities - no edema noted  Abdomen - soft, nontender, nondistended  Skin - normal coloration and turgor, no rashes, no suspicious skin lesions noted      A / P:     Diagnosis Orders   1. Essential hypertension  amLODIPine (NORVASC) 10 MG tablet    lisinopril-hydroCHLOROthiazide (PRINZIDE;ZESTORETIC) 20-12.5 MG per tablet      2. Sinobronchitis  albuterol sulfate HFA (VENTOLIN HFA) 108 (90 Base) MCG/ACT inhaler      3. Type 2 diabetes mellitus without complication, without long-term current use of insulin (HCC)  POCT glycosylated hemoglobin (Hb A1C)    Lipid Panel    CBC with Auto Differential    Comprehensive Metabolic Panel    Urinalysis    Microalbumin, Ur    atorvastatin (LIPITOR) 20 MG tablet      4. Hypothyroidism, unspecified type  levothyroxine (SYNTHROID) 150 MCG tablet    TSH    T4, Free      5. Vitamin D deficiency, unspecified  Vitamin D 25 Hydroxy      6. Severe obesity (BMI 35.0-39.9) with comorbidity (HCC)        7. Hyperlipidemia, unspecified hyperlipidemia type  Icosapent Ethyl (VASCEPA) 1 g CAPS capsule      8. Low serum vitamin B12  Vitamin B12 & Folate    cyanocobalamin 1000 MCG tablet    folic acid (FOLATE) 400 MCG tablet      9. Screen for colon cancer  Wei Gee MD,[facility]ology[facility] ([facility])            Continue norvasc 10mg  Continue prinzide  20mg-12.5mg  resume lipitor 20mg  resume vascepa for TGs  Labs as ordered  Resume metformin at dose of 500mg

## 2024-01-05 DIAGNOSIS — R77.9 ELEVATED SERUM PROTEIN LEVEL: Primary | ICD-10-CM

## 2024-01-05 DIAGNOSIS — E03.9 HYPOTHYROIDISM, UNSPECIFIED TYPE: ICD-10-CM

## 2024-01-05 DIAGNOSIS — R71.8 ELEVATED MCV: ICD-10-CM

## 2024-01-05 RX ORDER — LEVOTHYROXINE SODIUM 175 MCG
175 TABLET ORAL DAILY
Qty: 90 TABLET | Refills: 1 | Status: SHIPPED | OUTPATIENT
Start: 2024-01-05

## 2024-02-06 ENCOUNTER — OFFICE VISIT (OUTPATIENT)
Dept: FAMILY MEDICINE CLINIC | Age: 59
End: 2024-02-06
Payer: COMMERCIAL

## 2024-02-06 VITALS
SYSTOLIC BLOOD PRESSURE: 112 MMHG | OXYGEN SATURATION: 96 % | BODY MASS INDEX: 36.01 KG/M2 | WEIGHT: 254.6 LBS | DIASTOLIC BLOOD PRESSURE: 66 MMHG | TEMPERATURE: 97.1 F | HEART RATE: 85 BPM

## 2024-02-06 DIAGNOSIS — E11.9 TYPE 2 DIABETES MELLITUS WITHOUT COMPLICATION, WITHOUT LONG-TERM CURRENT USE OF INSULIN (HCC): ICD-10-CM

## 2024-02-06 DIAGNOSIS — Z01.818 PREOP EXAMINATION: ICD-10-CM

## 2024-02-06 DIAGNOSIS — E03.9 HYPOTHYROIDISM, UNSPECIFIED TYPE: ICD-10-CM

## 2024-02-06 DIAGNOSIS — E78.5 HYPERLIPIDEMIA, UNSPECIFIED HYPERLIPIDEMIA TYPE: ICD-10-CM

## 2024-02-06 DIAGNOSIS — Z01.818 PREOP EXAMINATION: Primary | ICD-10-CM

## 2024-02-06 DIAGNOSIS — I10 PRIMARY HYPERTENSION: ICD-10-CM

## 2024-02-06 LAB
INR BLD: 1.1
PROTHROMBIN TIME: 12.2 SEC (ref 9.3–12.4)
TSH SERPL DL<=0.05 MIU/L-ACNC: 30.76 UIU/ML (ref 0.27–4.2)

## 2024-02-06 PROCEDURE — 3074F SYST BP LT 130 MM HG: CPT | Performed by: FAMILY MEDICINE

## 2024-02-06 PROCEDURE — 99213 OFFICE O/P EST LOW 20 MIN: CPT | Performed by: FAMILY MEDICINE

## 2024-02-06 PROCEDURE — 93000 ELECTROCARDIOGRAM COMPLETE: CPT | Performed by: FAMILY MEDICINE

## 2024-02-06 PROCEDURE — 3078F DIAST BP <80 MM HG: CPT | Performed by: FAMILY MEDICINE

## 2024-02-06 RX ORDER — ALBUTEROL SULFATE 90 UG/1
2 AEROSOL, METERED RESPIRATORY (INHALATION) 4 TIMES DAILY PRN
Qty: 2 EACH | Refills: 5 | Status: SHIPPED | OUTPATIENT
Start: 2024-02-06

## 2024-02-06 NOTE — PROGRESS NOTES
CC: Surgical Clearance (Right Shoulder arthroplasty - total shoulder 2/25/24 with Dr. Martínez)      HPI:  Juan Garcia is a 58 y.o. yo male presents for a preoperative consultation at the request of surgeon, Dr. Neil, who plans on performing R shoulder hemiarthoplasty v total on 2/26/24 at [facility].  The current problem began years ago, and symptoms have been worsening with time.  Conservative therapy: Yes: ineffective.    Pre-Operative Risk assessment using 2014 ACC/AHA guidelines for non-cardiac surgery  Emergent procedure: No    Active Cardiac Condition: No   Risk Level of Procedure:   [] Low (0-1% of adverse cardiac events): superficial procedures, cataract/ breast surgery, endoscopy, superficial skin, ambulatory procedures.  [x] Intermediate (1-5%): carotid endarterectomy, intraperitoneal/intrathoracic surgery, orthopedic surgery, head and neck surgery, and prostate surgery.   [] High (> 5%): vascular or aortic repair surgery.  Measurement of Exercise Tolerance before Surgery >4 without symptoms: Yes . Pt can Climb a flight of stairs or walk up a hill (5.50 METs).   Assessment of Risk using RCRI (Revised Cardiac Risk Index) factors:   [] High-risk surgery   [] Ischemic heart disease   [] Hx of cerebrovascular disease  [] Hx of Heart failure  [] DM requiring insulin   [] Preop Cr > 2.0 mg/dL      HTN - on prinzide 20mg-12.5mg; norvasc up to 10mg  HLD / elevated TG: restarted on lipitor 20mg and vascepa 2g BID  DMII - recently resumed metfomrin 500 XR daily; last A1C today is 6.0%  Blurry vision: follows with retinal therapist  Hypothyroidism: on synthroid now 175mcg; now on namebrand medication although costly  Obesity -max weight 277lb 1/21 today 254lbs;  continues to work on diet and exercise  RYLEY- not on CPAP; Cannot wear mask  Depression- off of all medication; controlled  GERD - on prilosec previously; off of this **; symptoms improved with diet**  Psoriasis - follows with derm  Elevated

## 2024-02-06 NOTE — PROGRESS NOTES
CC: Juan Garcia is a 58 y.o. yo male is here for evaluation evaluation for the following acute & chronic medical concerns: Surgical Clearance (Right Shoulder arthroplasty - total shoulder 2/25/24 with Dr. Martínez)        HPI:        Vitals:   /66   Pulse 85   Temp 97.1 °F (36.2 °C) (Temporal)   Wt 115.5 kg (254 lb 9.6 oz)   SpO2 96%   BMI 36.01 kg/m²   Wt Readings from Last 3 Encounters:   02/06/24 115.5 kg (254 lb 9.6 oz)   01/04/24 115.6 kg (254 lb 12.8 oz)   09/28/23 117.7 kg (259 lb 6.4 oz)       PE:  Constitutional - alert, well appearing, and in no distress  Eyes - extraocular eye movements intact, left eye normal, right eye normal, no conjunctivitis noted  Neck - symmetric, no obvious masses noted  Respiratory- clear to auscultation, no wheezes, rales or rhonchi, symmetric air entry; no increased work of breathing  Cardiovascular - normal rate, regular rhythm, normal S1, S2, no murmurs, rubs, clicks or gallops  Extremities - no edema noted  Abdomen - soft, nontender, nondistended  Skin - normal coloration and turgor, no rashes, no suspicious skin lesions noted      A / P:     Diagnosis Orders   1. Preop examination  EKG 12 Lead - Clinic Performed    EKG 12 Lead - Clinic Performed      2. Sinobronchitis              Continue norvasc 10mg  Continue prinzide  20mg-12.5mg  resume lipitor 20mg  resume vascepa for TGs  Labs as ordered  Resume metformin at dose of 500mg XR daily for now  Consider changing to synthroid in future due to labile TSH  resume B12 and folate        RTO: No follow-ups on file.      An electronic signature was used to authenticate this note.  ---- Fantasma Perez MD on 2/6/2024 at 2:04 PM

## 2024-02-07 DIAGNOSIS — E03.9 HYPOTHYROIDISM, UNSPECIFIED TYPE: ICD-10-CM

## 2024-02-07 RX ORDER — LEVOTHYROXINE SODIUM 200 MCG
200 TABLET ORAL DAILY
Qty: 90 TABLET | Refills: 1 | Status: SHIPPED | OUTPATIENT
Start: 2024-02-07

## 2024-02-08 RX ORDER — OMEPRAZOLE 20 MG/1
20 CAPSULE, DELAYED RELEASE ORAL DAILY
Qty: 30 CAPSULE | Refills: 10 | OUTPATIENT
Start: 2024-02-08

## 2024-02-13 DIAGNOSIS — K21.9 GASTROESOPHAGEAL REFLUX DISEASE, UNSPECIFIED WHETHER ESOPHAGITIS PRESENT: ICD-10-CM

## 2024-02-13 RX ORDER — OMEPRAZOLE 20 MG/1
20 CAPSULE, DELAYED RELEASE ORAL DAILY
Qty: 90 CAPSULE | Refills: 0 | Status: SHIPPED | OUTPATIENT
Start: 2024-02-13

## 2024-02-13 NOTE — TELEPHONE ENCOUNTER
Pt needs refills:    Omeprazole 20 MG    Pharmacy: [facility]

## 2024-02-19 ENCOUNTER — HOSPITAL ENCOUNTER (OUTPATIENT)
Age: 59
Discharge: HOME | End: 2024-02-21

## 2024-02-19 LAB
ABO + RH BLD: NORMAL
ARM BAND NUMBER: NORMAL
BLOOD BANK SAMPLE EXPIRATION: NORMAL
BLOOD GROUP ANTIBODIES SERPL: NEGATIVE
HBA1C MFR BLD: 6.5 % (ref 4–5.6)

## 2024-02-19 PROCEDURE — 86900 BLOOD TYPING SEROLOGIC ABO: CPT

## 2024-02-19 PROCEDURE — 83036 HEMOGLOBIN GLYCOSYLATED A1C: CPT

## 2024-02-19 PROCEDURE — 86901 BLOOD TYPING SEROLOGIC RH(D): CPT

## 2024-02-19 PROCEDURE — 87081 CULTURE SCREEN ONLY: CPT

## 2024-02-19 PROCEDURE — 86850 RBC ANTIBODY SCREEN: CPT

## 2024-02-21 LAB
MICROORGANISM SPEC CULT: NORMAL
SPECIMEN DESCRIPTION: NORMAL

## 2024-02-27 ENCOUNTER — HOSPITAL ENCOUNTER (OUTPATIENT)
Age: 59
Discharge: HOME | End: 2024-02-29

## 2024-02-27 LAB
ANION GAP SERPL CALCULATED.3IONS-SCNC: 14 MMOL/L (ref 7–16)
BUN SERPL-MCNC: 18 MG/DL (ref 6–20)
CALCIUM SERPL-MCNC: 9.1 MG/DL (ref 8.6–10.2)
CHLORIDE SERPL-SCNC: 96 MMOL/L (ref 98–107)
CO2 SERPL-SCNC: 22 MMOL/L (ref 22–29)
CREAT SERPL-MCNC: 0.9 MG/DL (ref 0.7–1.2)
ERYTHROCYTE [DISTWIDTH] IN BLOOD BY AUTOMATED COUNT: 12.6 % (ref 11.5–15)
GFR SERPL CREATININE-BSD FRML MDRD: >60 ML/MIN/1.73M2
GLUCOSE SERPL-MCNC: 253 MG/DL (ref 74–99)
HCT VFR BLD AUTO: 39.1 % (ref 37–54)
HGB BLD-MCNC: 13.3 G/DL (ref 12.5–16.5)
MCH RBC QN AUTO: 34.4 PG (ref 26–35)
MCHC RBC AUTO-ENTMCNC: 34 G/DL (ref 32–34.5)
MCV RBC AUTO: 101 FL (ref 80–99.9)
PLATELET # BLD AUTO: 174 K/UL (ref 130–450)
PMV BLD AUTO: 10.8 FL (ref 7–12)
POTASSIUM SERPL-SCNC: 4.3 MMOL/L (ref 3.5–5)
RBC # BLD AUTO: 3.87 M/UL (ref 3.8–5.8)
SODIUM SERPL-SCNC: 132 MMOL/L (ref 132–146)
WBC OTHER # BLD: 10.2 K/UL (ref 4.5–11.5)

## 2024-02-27 PROCEDURE — 80048 BASIC METABOLIC PNL TOTAL CA: CPT

## 2024-02-27 PROCEDURE — 85027 COMPLETE CBC AUTOMATED: CPT

## 2024-05-22 DIAGNOSIS — K21.9 GASTROESOPHAGEAL REFLUX DISEASE, UNSPECIFIED WHETHER ESOPHAGITIS PRESENT: ICD-10-CM

## 2024-05-23 RX ORDER — OMEPRAZOLE 20 MG/1
20 CAPSULE, DELAYED RELEASE ORAL DAILY
Qty: 30 CAPSULE | Refills: 10 | OUTPATIENT
Start: 2024-05-23

## 2024-06-07 DIAGNOSIS — K21.9 GASTROESOPHAGEAL REFLUX DISEASE, UNSPECIFIED WHETHER ESOPHAGITIS PRESENT: ICD-10-CM

## 2024-06-07 RX ORDER — OMEPRAZOLE 20 MG/1
20 CAPSULE, DELAYED RELEASE ORAL DAILY
Qty: 90 CAPSULE | Refills: 1 | Status: SHIPPED | OUTPATIENT
Start: 2024-06-07

## 2024-06-07 NOTE — TELEPHONE ENCOUNTER
Medication Refill Request    LOV 2/6/2024  NOV 9/4/2024    Lab Results   Component Value Date    CREATININE 0.9 02/27/2024